# Patient Record
Sex: MALE | Race: WHITE | NOT HISPANIC OR LATINO | Employment: OTHER | URBAN - METROPOLITAN AREA
[De-identification: names, ages, dates, MRNs, and addresses within clinical notes are randomized per-mention and may not be internally consistent; named-entity substitution may affect disease eponyms.]

---

## 2021-04-14 DIAGNOSIS — Z23 ENCOUNTER FOR IMMUNIZATION: ICD-10-CM

## 2021-12-14 ENCOUNTER — OFFICE VISIT (OUTPATIENT)
Dept: ENDOCRINOLOGY | Facility: CLINIC | Age: 71
End: 2021-12-14
Payer: MEDICARE

## 2021-12-14 VITALS
WEIGHT: 220 LBS | SYSTOLIC BLOOD PRESSURE: 146 MMHG | HEIGHT: 74 IN | BODY MASS INDEX: 28.23 KG/M2 | DIASTOLIC BLOOD PRESSURE: 86 MMHG | OXYGEN SATURATION: 97 % | TEMPERATURE: 97.9 F | HEART RATE: 84 BPM | RESPIRATION RATE: 16 BRPM

## 2021-12-14 DIAGNOSIS — E78.5 HYPERLIPIDEMIA, UNSPECIFIED HYPERLIPIDEMIA TYPE: ICD-10-CM

## 2021-12-14 DIAGNOSIS — E11.65 TYPE 2 DIABETES MELLITUS WITH HYPERGLYCEMIA, WITH LONG-TERM CURRENT USE OF INSULIN (CMS/HCC): Primary | ICD-10-CM

## 2021-12-14 DIAGNOSIS — E65 LIPOHYPERTROPHY: ICD-10-CM

## 2021-12-14 DIAGNOSIS — Z79.4 LONG-TERM INSULIN USE (CMS/HCC): ICD-10-CM

## 2021-12-14 DIAGNOSIS — I10 HYPERTENSION, UNSPECIFIED TYPE: ICD-10-CM

## 2021-12-14 DIAGNOSIS — Z79.4 TYPE 2 DIABETES MELLITUS WITH HYPERGLYCEMIA, WITH LONG-TERM CURRENT USE OF INSULIN (CMS/HCC): Primary | ICD-10-CM

## 2021-12-14 PROBLEM — A69.20 LYME DISEASE: Status: ACTIVE | Noted: 2021-11-08

## 2021-12-14 PROBLEM — E78.49 OTHER HYPERLIPIDEMIA: Status: ACTIVE | Noted: 2019-02-11

## 2021-12-14 PROBLEM — F41.8 DEPRESSION WITH ANXIETY: Status: ACTIVE | Noted: 2021-09-10

## 2021-12-14 PROBLEM — G47.33 OBSTRUCTIVE SLEEP APNEA (ADULT) (PEDIATRIC): Status: ACTIVE | Noted: 2021-09-10

## 2021-12-14 PROBLEM — E04.1 THYROID NODULE: Status: ACTIVE | Noted: 2019-12-10

## 2021-12-14 PROBLEM — M10.9 GOUT, UNSPECIFIED: Status: ACTIVE | Noted: 2021-09-10

## 2021-12-14 PROBLEM — K63.5 COLON POLYP: Status: ACTIVE | Noted: 2021-09-10

## 2021-12-14 PROBLEM — L30.9 DERMATITIS: Status: ACTIVE | Noted: 2021-09-10

## 2021-12-14 PROBLEM — N40.0 BENIGN PROSTATIC HYPERPLASIA: Status: ACTIVE | Noted: 2021-09-10
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
## 2021-12-14 PROBLEM — E78.00 PURE HYPERCHOLESTEROLEMIA: Status: ACTIVE | Noted: 2021-09-10

## 2021-12-14 PROBLEM — E55.9 VITAMIN D DEFICIENCY: Status: ACTIVE | Noted: 2019-02-11

## 2021-12-14 PROBLEM — E10.9: Status: ACTIVE | Noted: 2018-10-29

## 2021-12-14 PROCEDURE — G8753 SYS BP > OR = 140: HCPCS | Performed by: INTERNAL MEDICINE

## 2021-12-14 PROCEDURE — G8754 DIAS BP LESS 90: HCPCS | Performed by: INTERNAL MEDICINE

## 2021-12-14 PROCEDURE — 95251 CONT GLUC MNTR ANALYSIS I&R: CPT | Performed by: INTERNAL MEDICINE

## 2021-12-14 PROCEDURE — 99214 OFFICE O/P EST MOD 30 MIN: CPT | Performed by: INTERNAL MEDICINE

## 2021-12-14 RX ORDER — METFORMIN HYDROCHLORIDE 500 MG/1
TABLET ORAL
COMMUNITY
Start: 2021-11-21 | End: 2021-12-14 | Stop reason: SDUPTHER

## 2021-12-14 RX ORDER — METFORMIN HYDROCHLORIDE 500 MG/1
500 TABLET ORAL 2 TIMES DAILY WITH MEALS
Qty: 180 TABLET | Refills: 1 | Status: SHIPPED | OUTPATIENT
Start: 2021-12-14 | End: 2022-03-16

## 2021-12-14 RX ORDER — BLOOD-GLUCOSE,RECEIVER,CONT
EACH MISCELLANEOUS
COMMUNITY
Start: 2021-01-22 | End: 2022-06-16

## 2021-12-14 RX ORDER — INSULIN ASPART 100 [IU]/ML
INJECTION, SOLUTION INTRAVENOUS; SUBCUTANEOUS
COMMUNITY
Start: 2021-11-29 | End: 2022-06-16 | Stop reason: SDUPTHER

## 2021-12-14 RX ORDER — PEN NEEDLE, DIABETIC 30 GX3/16"
NEEDLE, DISPOSABLE MISCELLANEOUS
COMMUNITY
Start: 2021-06-05 | End: 2022-03-01 | Stop reason: SDUPTHER

## 2021-12-14 RX ORDER — CITALOPRAM 20 MG/1
20 TABLET, FILM COATED ORAL DAILY
COMMUNITY
End: 2022-03-16

## 2021-12-14 RX ORDER — INSULIN ASPART 100 [IU]/ML
INJECTION, SOLUTION INTRAVENOUS; SUBCUTANEOUS
COMMUNITY
Start: 2021-05-13 | End: 2022-03-16

## 2021-12-14 RX ORDER — TRAZODONE HYDROCHLORIDE 50 MG/1
50 TABLET ORAL
COMMUNITY
Start: 2021-07-17

## 2021-12-14 RX ORDER — DOXYCYCLINE HYCLATE 100 MG
TABLET ORAL
COMMUNITY
Start: 2021-11-08 | End: 2022-03-16

## 2021-12-14 RX ORDER — BLOOD-GLUCOSE SENSOR
EACH MISCELLANEOUS
COMMUNITY
Start: 2021-11-16 | End: 2022-06-16 | Stop reason: SDUPTHER

## 2021-12-14 RX ORDER — INSULIN GLARGINE 100 [IU]/ML
INJECTION, SOLUTION SUBCUTANEOUS
COMMUNITY
Start: 2021-05-13 | End: 2021-12-14

## 2021-12-14 RX ORDER — INSULIN GLARGINE 100 [IU]/ML
18 INJECTION, SOLUTION SUBCUTANEOUS DAILY
COMMUNITY
Start: 2021-11-21 | End: 2022-06-16 | Stop reason: SDUPTHER

## 2021-12-14 RX ORDER — BLOOD-GLUCOSE TRANSMITTER
EACH MISCELLANEOUS
COMMUNITY
Start: 2021-11-22 | End: 2022-06-16 | Stop reason: SDUPTHER

## 2021-12-14 RX ORDER — LANCETS 33 GAUGE
EACH MISCELLANEOUS
COMMUNITY
Start: 2021-06-05 | End: 2022-06-16

## 2021-12-14 NOTE — PROGRESS NOTES
REASON FOR VISIT:   Akin Daily is a 71 y.o. male who presents today for evaluation and management of his diabetes.   A consultation was requested by Ana Delgado MD.     Previous patient at [facility].  Interim followed Dr. Gregorio Coburn NP     Patient was accompanied by his wife who provided additional history.    DIABETES HISTORY:  Type of Diabetes: Initially type 2 in 2005 and as YANCI by Dr. Esquivel in 2014 (by blood test per wife). Since then on insulin  Prior DKA: no    Current diabetes treatment:   Basaglar 20 units AM  Novolog - BF ~ 12 units, lunch 0-8 units (takes 3 days a week), dinner 11-13 units  Metformin 500 mg bid since a year, started by Dr. Perkins  Correction scale sugar >150 1 unit for every 50 mg -does not use often    Past regimen:  Metformin, Jardiance    Continuous glucose monitor: Dexcom G6  CGM downloaded and values reviewed : Yes  Current regime: above  No. of days of tracings available 14  Average blood glucose 168  Stand dev 45  % above target 34  % at target 65  % below target <1  Continuous Glucose Monitoring (CGM) Interpretation  I reviewed 72 hours + of CGM data.   There is a trend for blood sugar postprandial hyperglycemia, elevated bedtime sugars, lowest sugars post lunch/predinner  Recommendations: below      COMPLICATIONS OF DIABETES:  Eye disease in the past: no DR   Last eye exam: 4/20    Nephropathy:  no    Peripheral neuropathy: no   History of foot sores/infections/amputations: no   Podiatrist     Macrovascular disease: Hyperlipidemia, (stopped statin for muscle aches)      DIET/EXERCISE HISTORY:  Diet: 2 meals/day, lunch is very light,   Exercise: no      GLYCEMIC CONTROL:  Glucometer: One Touch  Frequency of glucose monitoring:   Review of home blood glucose readings:  Before breakfast:    After breakfast:   Before lunch:   After lunch:   Before dinner:   After dinner:   Bedtime:   2 am:   Hypoglycemic awareness: yes  Frequency of lows: 1-2/week.  He is targeting  "sugar over 150 to prevent hypoglycemia overnight         Past Medical History:   Diagnosis Date   • Diabetes mellitus type I (CMS/HCC)      History reviewed. No pertinent surgical history.  Family History   Problem Relation Age of Onset   • Diabetes Biological Mother    • Heart attack Biological Mother    • Lung cancer Biological Father    • Colon cancer Biological Brother    • Diabetes Biological Brother    • Diabetes Maternal Grandmother      Current Outpatient Medications   Medication Sig Dispense Refill   • BASAGLAR KWIKPEN U-100 INSULIN 100 unit/mL (3 mL) pen      • blood-glucose meter,continuous (DEXCOM G6 ) misc by Not Applicable route.     • DEXCOM G6 SENSOR device      • DEXCOM G6 TRANSMITTER device      • insulin aspart U-100 100 unit/mL (3 mL) pen To use with meals as per the scale, max daily 40 units     • lancets (ONETOUCH DELICA PLUS LANCET) 33 gauge misc      • metFORMIN 500 mg tablet Take 1 tablet (500 mg total) by mouth 2 (two) times a day with meals. 180 tablet 1   • NovoLOG Flexpen U-100 Insulin 100 unit/mL (3 mL) pen      • pen needle, diabetic (BD MILLIE 2ND GEN PEN NEEDLE) 32 gauge x 5/32\" needle      • traZODone 50 mg tablet Take 50 mg by mouth.     • citalopram 20 mg tablet Take 20 mg by mouth daily.     • doxycycline hyclate 100 mg tablet        No current facility-administered medications for this visit.     Allergies   Allergen Reactions   • Poison Ivy Extract Rash   • Hay Fever And Allergy Relief      Social History     Socioeconomic History   • Marital status:      Spouse name: Not on file   • Number of children: Not on file   • Years of education: Not on file   • Highest education level: Not on file   Occupational History   • Not on file   Tobacco Use   • Smoking status: Never Smoker   • Smokeless tobacco: Never Used   Substance and Sexual Activity   • Alcohol use: Never   • Drug use: Never   • Sexual activity: Not on file   Other Topics Concern   • Not on file   Social " "History Narrative   • Not on file     Social Determinants of Health     Financial Resource Strain: Not on file   Food Insecurity: Not on file   Transportation Needs: Not on file   Physical Activity: Not on file   Stress: Not on file   Social Connections: Not on file   Intimate Partner Violence: Not on file   Housing Stability: Not on file        ROS:  The complete review of systems is otherwise negative except as noted in HPI.    PHYSICAL EXAM:  Vitals:    12/14/21 1258   BP: (!) 146/86   BP Location: Left upper arm   Patient Position: Sitting   Pulse: 84   Resp: 16   Temp: 36.6 °C (97.9 °F)   TempSrc: Temporal   SpO2: 97%   Weight: 99.8 kg (220 lb)   Height: 1.88 m (6' 2\")       Body mass index is 28.25 kg/m².    Wt Readings from Last 3 Encounters:   12/14/21 99.8 kg (220 lb)        GENERAL: Well developed, well nourished, in no acute distress  EYES: conjunctiva pink and moist, no proptosis  NECK/LYMPHATIC: Supple, nl cervical lymphadenopathy, acanthosis no   THYROID: thyroid palpable, no distinct nodules palpated, non tender on my exam  CARDIOVASCULAR: Regular rate and Regular rhythm  RESPIRATORY: Symmetrical chest expansion, normal respiratory effort, clear to auscultation bilaterally  GASTROINTESTINAL: Soft, non tender, inj site lipodystrophy  MUSCULOSKELETAL: no cyanosis, normal muscle mass, no edema in lower extremities  SKIN: Warm, dry, no lesions   NEUROLOGIC: Awake, alert, and communicating appropriately       OUTSIDE RECORDS: reviewed. Pertinent positives summarized in HPI    LABS:   12/21 -calcium 9.2, creatinine 1.16, LFTs normal, GFR over 60, urine microalbumin 10, , HDL 51, triglycerides 124, A1c 8.2    No results found for: HGBA1C, GLUCOSE, NA, K, CO2, CL, BUN, EGFR, CREATININE, CA, ALT, CHOL, HDL, LDL, TRIG, TSH    Imaging:     ASSESSMENT and PLAN    Akin Daily is a 71 y.o. male with YANCI complicated by hyperlipidemia    1. Diabetes Mellitus: YANCI with hyperglycemia/long-term insulin use  A1c: " 8.2 %    Recommendations based on review of CGM    We discussed in detail that he would benefit from carb counting since his meals are not carb consistent.  He does not feel comfortable  Lower Basaglar to 18 units daily  Reviewed bedtime blood sugar goal >100, advised not to eat at bedtime to achieve >150  Continue NovoLog 12 units with breakfast and dinner  Continue Metformin 500 mg twice daily  To use correction scale for lunch   Check antibodies for type I    Advised pt to contact the office for further medication adjustment if blood glucose out of target range for 3-5 days below 70 or above 250    2.  Hypertension: Elevated  He attributes to drinking coffee  Continue monitoring at home  Urine micro albumin normal    3.  Hyperlipidemia: LDL not at goal  Statin, Zetia could not tolerate muscle leg cramps  Follows PCP    4. Thyroid nodules  Stable left thyroid gland nodules and colloid cyst - Last ultrasound 2019  We will discuss at next visit     5.  Lipohypertrophy  Advised to avoid belly for insulin administration. Reviewed importance of site rotation and discussed alternate sites for insulin administration and Dexcom CGM    Several elements of diabetes self management were reviewed including, but not limited to the importance of blood sugar monitoring, symptoms and treatment of hypoglycemia and hyperglycemia/A1c goals, compliance with medications, diet, exercise, lifestyle issues, surveillance of eyes and feet and need for routine follow-up with ophthalmology and podiatry.    Pt is aware of alternatives of therapy, risks and benefits and accepts risk of default.      I have answered all of my patient's questions to the best of my ability. To call us with any changes      Orders Placed This Encounter   Procedures   • Hemoglobin A1c   • Zinc Transporter 8 (ZnT8) Antibody, test code-02878, CPT code - 97429 -[facility] - Miscellaneous Test   • Glutamic Acid Decarboxylase-65 Antibody   • IA2 Autoantibodies, TEST number:  [id number], CPT: 65992 - [facility] - Miscellaneous Test       This note was sent to PCP    Return to office in 3 months or earlier if issues arise     This patient has been dictated using speech recognition software. Inadvertent speech recognition errors should be disregarded. Please do not hesitate to call the office for clarification.

## 2022-03-01 DIAGNOSIS — E11.65 UNCONTROLLED TYPE 2 DIABETES MELLITUS WITH HYPERGLYCEMIA (CMS/HCC): Primary | ICD-10-CM

## 2022-03-01 RX ORDER — PEN NEEDLE, DIABETIC 30 GX3/16"
200 NEEDLE, DISPOSABLE MISCELLANEOUS 4 TIMES DAILY
Qty: 200 EACH | Refills: 1 | Status: SHIPPED | OUTPATIENT
Start: 2022-03-01 | End: 2022-06-07

## 2022-03-01 NOTE — TELEPHONE ENCOUNTER
"    Do you have enough medication for the next 5 days?: yes    Did you request this medication through your pharmacy or our patient portal in the last day or two? no    Name of medication requested:   pen needle, diabetic (BD MILLIE 2ND GEN PEN NEEDLE) 32 gauge x 5/32\" needle       Medication Strength:   Mediation Directions:   Quantity Requested (example 30/90):   Number of refills requested:       System Generated Preferred Pharmacy(s)  are as follows.  If more than one pharmacy displays please identify which one should be used for this call    Has the pharmacy information below been confirmed with the patient?    yes     [id number] - [place] - [address] [place] [address]  [place] [address]  Phone: [phone] Fax: [phone]          If necessary, provide pharmacy details below.     Is this pharmacy a mail order pharmacy?:   Pharmacy Name:   Pharmacy City:   Pharmacy Telephone:     Additional Comments:    Next Encounter with this provider: 3/16/2022  "

## 2022-03-16 ENCOUNTER — OFFICE VISIT (OUTPATIENT)
Dept: ENDOCRINOLOGY | Facility: CLINIC | Age: 72
End: 2022-03-16
Payer: MEDICARE

## 2022-03-16 VITALS
TEMPERATURE: 97.9 F | DIASTOLIC BLOOD PRESSURE: 80 MMHG | HEIGHT: 74 IN | SYSTOLIC BLOOD PRESSURE: 140 MMHG | RESPIRATION RATE: 16 BRPM | HEART RATE: 80 BPM | WEIGHT: 218 LBS | BODY MASS INDEX: 27.98 KG/M2 | OXYGEN SATURATION: 95 %

## 2022-03-16 DIAGNOSIS — E11.65 TYPE 2 DIABETES MELLITUS WITH HYPERGLYCEMIA, WITH LONG-TERM CURRENT USE OF INSULIN (CMS/HCC): Primary | ICD-10-CM

## 2022-03-16 DIAGNOSIS — I10 HYPERTENSION, UNSPECIFIED TYPE: ICD-10-CM

## 2022-03-16 DIAGNOSIS — E65 LIPOHYPERTROPHY: ICD-10-CM

## 2022-03-16 DIAGNOSIS — Z79.4 TYPE 2 DIABETES MELLITUS WITH HYPERGLYCEMIA, WITH LONG-TERM CURRENT USE OF INSULIN (CMS/HCC): Primary | ICD-10-CM

## 2022-03-16 DIAGNOSIS — Z79.4 LONG-TERM INSULIN USE (CMS/HCC): ICD-10-CM

## 2022-03-16 DIAGNOSIS — E78.5 HYPERLIPIDEMIA, UNSPECIFIED HYPERLIPIDEMIA TYPE: ICD-10-CM

## 2022-03-16 PROCEDURE — G8754 DIAS BP LESS 90: HCPCS | Performed by: INTERNAL MEDICINE

## 2022-03-16 PROCEDURE — 99214 OFFICE O/P EST MOD 30 MIN: CPT | Performed by: INTERNAL MEDICINE

## 2022-03-16 PROCEDURE — 95251 CONT GLUC MNTR ANALYSIS I&R: CPT | Performed by: INTERNAL MEDICINE

## 2022-03-16 PROCEDURE — G8753 SYS BP > OR = 140: HCPCS | Performed by: INTERNAL MEDICINE

## 2022-03-16 NOTE — PROGRESS NOTES
REASON FOR VISIT:   Akin Daily is a 71 y.o. male who presents today for evaluation and management of his diabetes.   A consultation was requested by Ana Delgado MD.     Previous patient at [facility]. Interim followed [facility], Dr. Perkins     Patient was accompanied by his wife who provided additional history.    03/16/22   No issues interim        DIABETES HISTORY:  Type of Diabetes: Initially type 2 in 2005 and as YANCI by Dr. Esquivel in 2014 (by blood test per wife). Since then on insulin  Prior DKA: no    Current diabetes treatment:   Basaglar 18 units AM  Novolog - BF ~ 12 units, lunch 9-10 units (if eats full lunch), dinner 12-13 units  Correction scale sugar >150 1 unit for every 50 mg - does not use often    Past regimen:  Metformin, Jardiance  Stopped Metformin with belching    Continuous glucose monitor: Dexcom G6  CGM downloaded and values reviewed : Yes  Current regime: above  No. of days of tracings available 14  Average blood glucose 186  Stand dev 52   % above target 48   % at target 51.5   % below target 0.1  Continuous Glucose Monitoring (CGM) Interpretation  I reviewed 72 hours + of CGM data.   There is a trend for blood sugar elevated bedtime sugars, tries to keep over 180 at bedtime, Post lunch hyperglycemia at times, variable post breakfast or post lunch hypoglycemia  Recommendations: below      COMPLICATIONS OF DIABETES:  Eye disease in the past: no DR   Last eye exam: 4/20    Nephropathy:  no    Peripheral neuropathy: no   History of foot sores/infections/amputations: no   Podiatrist     Macrovascular disease: Hyperlipidemia, (stopped statin for muscle aches)      DIET/EXERCISE HISTORY:  Diet: 2 meals/day, lunch is very light,   Snack: 2 small Ice cream bars daily, Peanut at night, or cracker if BS <180  Exercise: no      GLYCEMIC CONTROL:  Glucometer: One Touch  Frequency of glucose monitoring: Follows Dexcom G6  Review of home blood glucose readings:  Before breakfast:    After  "breakfast:   Before lunch:   After lunch:   Before dinner:   After dinner:   Bedtime:   2 am:   Hypoglycemic awareness: yes  Frequency of lows: 1-2/week.       Past Medical History:   Diagnosis Date   • Benign prostatic hyperplasia 9/10/2021   • Diabetes mellitus type I (CMS/HCC)    • Gout, unspecified 9/10/2021   • Obstructive sleep apnea (adult) (pediatric) 9/10/2021   • Other hyperlipidemia 2/11/2019   • Thyroid nodule 12/10/2019     History reviewed. No pertinent surgical history.  Family History   Problem Relation Age of Onset   • Diabetes Biological Mother    • Heart attack Biological Mother    • Lung cancer Biological Father    • Colon cancer Biological Brother    • Diabetes Biological Brother    • Diabetes Maternal Grandmother      Current Outpatient Medications   Medication Sig Dispense Refill   • BASAGLAR KWIKPEN U-100 INSULIN 100 unit/mL (3 mL) pen Inject 18 Units under the skin daily.       • blood-glucose meter,continuous (DEXCOM G6 ) misc by Not Applicable route.     • DEXCOM G6 SENSOR device      • DEXCOM G6 TRANSMITTER device      • lancets (ONETOUCH DELICA PLUS LANCET) 33 gauge misc      • NovoLOG Flexpen U-100 Insulin 100 unit/mL (3 mL) pen      • pen needle, diabetic (BD MILLIE 2ND GEN PEN NEEDLE) 32 gauge x 5/32\" needle Inject 200 each under the skin 4 (four) times a day. 200 each 1   • traZODone 50 mg tablet Take 50 mg by mouth.     • citalopram 20 mg tablet Take 20 mg by mouth daily.     • doxycycline hyclate 100 mg tablet      • insulin aspart U-100 100 unit/mL (3 mL) pen To use with meals as per the scale, max daily 40 units       No current facility-administered medications for this visit.     Allergies   Allergen Reactions   • Poison Ivy Extract Rash   • Hay Fever And Allergy Relief      Social History     Socioeconomic History   • Marital status:      Spouse name: Not on file   • Number of children: Not on file   • Years of education: Not on file   • Highest education level: " "Not on file   Occupational History   • Not on file   Tobacco Use   • Smoking status: Never Smoker   • Smokeless tobacco: Never Used   Substance and Sexual Activity   • Alcohol use: Never   • Drug use: Never   • Sexual activity: Not on file   Other Topics Concern   • Not on file   Social History Narrative   • Not on file     Social Determinants of Health     Financial Resource Strain: Not on file   Food Insecurity: Not on file   Transportation Needs: Not on file   Physical Activity: Not on file   Stress: Not on file   Social Connections: Not on file   Intimate Partner Violence: Not on file   Housing Stability: Not on file        ROS:  The complete review of systems is otherwise negative except as noted in HPI.    PHYSICAL EXAM:  Vitals:    03/16/22 1338   BP: 140/80   BP Location: Right upper arm   Patient Position: Sitting   Pulse: 80   Resp: 16   Temp: 36.6 °C (97.9 °F)   TempSrc: Temporal   SpO2: 95%   Weight: 98.9 kg (218 lb)   Height: 1.88 m (6' 2\")       Body mass index is 27.99 kg/m².    Wt Readings from Last 3 Encounters:   03/16/22 98.9 kg (218 lb)   12/14/21 99.8 kg (220 lb)        GENERAL: Well developed, well nourished, in no acute distress  EYES: conjunctiva pink and moist, no proptosis  NECK/LYMPHATIC: Supple, nl cervical lymphadenopathy, acanthosis no   THYROID: thyroid palpable, no distinct nodules palpated, non tender on my exam  CARDIOVASCULAR: Regular rate and Regular rhythm  RESPIRATORY: Symmetrical chest expansion, normal respiratory effort, clear to auscultation bilaterally  GASTROINTESTINAL: Soft, non tender, inj site lipodystrophy  MUSCULOSKELETAL: no cyanosis, normal muscle mass, no edema in lower extremities  SKIN: Warm, dry, no lesions   NEUROLOGIC: Awake, alert, and communicating appropriately       OUTSIDE RECORDS: reviewed. Pertinent positives summarized in HPI    LABS:   3/22 -A1c 8.5, MANSOOR <5    12/21 -calcium 9.2, creatinine 1.16, LFTs normal, GFR over 60, urine microalbumin 10, LDL " 129, HDL 51, triglycerides 124, A1c 8.2    No results found for: HGBA1C, GLUCOSE, NA, K, CO2, CL, BUN, EGFR, CREATININE, CA, ALT, CHOL, HDL, LDL, TRIG, TSH    Imaging:     ASSESSMENT and PLAN    Akin Daily is a 71 y.o. male with YANCI complicated by hyperlipidemia    1. Diabetes Mellitus:   YANCI with hyperglycemia/long-term insulin use  A1c worsen: 8.5 %.  Reviewed goal A1c under 7 and target >70%    Recommendations based on review of CGM  Reviewed bedtime blood sugar goal >100, advised not to eat at bedtime to achieve >180.  To limit ice creams after dinner  We discussed in detail that he would benefit from carb counting since his meals are not carb consistent.    Continue Basaglar 18 units daily  Continue NovoLog 12 units with breakfast and dinner  Stop Metformin experiencing side effects  To take half the Novolog insulin if eating <50 %  Correction scale sugar >150 1 unit for every 50 mg rise in sugar    Advised pt to contact the office for further medication adjustment if blood glucose out of target range for 3-5 days below 70 or above 250    2.  Hypertension: At goal  Urine micro albumin normal  Check in a year 12/22    3.  Hyperlipidemia: LDL not at goal  Statin, Zetia could not tolerate muscle leg cramps  Follows PCP    4. Thyroid nodules  Stable left thyroid gland nodules and colloid cyst - Last ultrasound 2019  Check TFT, thyroid ultrasound at next visit    5.  Lipohypertrophy  Advised to avoid belly for insulin administration. Reviewed importance of site rotation and discussed alternate sites for insulin administration       Several elements of diabetes self management were reviewed including, but not limited to the importance of blood sugar monitoring, symptoms and treatment of hypoglycemia and hyperglycemia/A1c goals, compliance with medications, diet, exercise, lifestyle issues, surveillance of eyes and feet and need for routine follow-up with ophthalmology and podiatry.    Pt is aware of alternatives of  therapy, risks and benefits and accepts risk of default.      I have answered all of my patient's questions to the best of my ability. To call us with any changes      Orders Placed This Encounter   Procedures   • Hemoglobin A1c       Return to office in 3 months or earlier if issues arise     This patient has been dictated using speech recognition software. Inadvertent speech recognition errors should be disregarded. Please do not hesitate to call the office for clarification.

## 2022-06-07 RX ORDER — PEN NEEDLE, DIABETIC 32GX 5/32"
NEEDLE, DISPOSABLE MISCELLANEOUS
COMMUNITY
Start: 2022-04-16 | End: 2022-06-16

## 2022-06-16 ENCOUNTER — OFFICE VISIT (OUTPATIENT)
Dept: ENDOCRINOLOGY | Facility: CLINIC | Age: 72
End: 2022-06-16
Payer: MEDICARE

## 2022-06-16 VITALS
BODY MASS INDEX: 26.95 KG/M2 | RESPIRATION RATE: 16 BRPM | HEART RATE: 64 BPM | DIASTOLIC BLOOD PRESSURE: 80 MMHG | SYSTOLIC BLOOD PRESSURE: 128 MMHG | WEIGHT: 210 LBS | HEIGHT: 74 IN | OXYGEN SATURATION: 97 %

## 2022-06-16 DIAGNOSIS — E65 LIPOHYPERTROPHY: ICD-10-CM

## 2022-06-16 DIAGNOSIS — E78.5 HYPERLIPIDEMIA, UNSPECIFIED HYPERLIPIDEMIA TYPE: ICD-10-CM

## 2022-06-16 DIAGNOSIS — E16.2 HYPOGLYCEMIA: ICD-10-CM

## 2022-06-16 DIAGNOSIS — Z79.4 LONG-TERM INSULIN USE (CMS/HCC): Primary | ICD-10-CM

## 2022-06-16 DIAGNOSIS — E04.2 MULTIPLE THYROID NODULES: ICD-10-CM

## 2022-06-16 DIAGNOSIS — I10 HYPERTENSION, UNSPECIFIED TYPE: ICD-10-CM

## 2022-06-16 DIAGNOSIS — E13.9 LADA (LATENT AUTOIMMUNE DIABETES IN ADULTS), MANAGED AS TYPE 1 (CMS/HCC): ICD-10-CM

## 2022-06-16 PROBLEM — E10.9: Status: RESOLVED | Noted: 2018-10-29 | Resolved: 2022-06-16

## 2022-06-16 PROBLEM — E11.65 TYPE 2 DIABETES MELLITUS WITH HYPERGLYCEMIA, WITH LONG-TERM CURRENT USE OF INSULIN (CMS/HCC): Status: RESOLVED | Noted: 2021-12-14 | Resolved: 2022-06-16

## 2022-06-16 PROBLEM — S86.001A INJURY OF RIGHT ACHILLES TENDON: Status: ACTIVE | Noted: 2022-04-21

## 2022-06-16 PROCEDURE — G8752 SYS BP LESS 140: HCPCS | Performed by: INTERNAL MEDICINE

## 2022-06-16 PROCEDURE — G8754 DIAS BP LESS 90: HCPCS | Performed by: INTERNAL MEDICINE

## 2022-06-16 PROCEDURE — 99214 OFFICE O/P EST MOD 30 MIN: CPT | Performed by: INTERNAL MEDICINE

## 2022-06-16 RX ORDER — BLOOD-GLUCOSE SENSOR
EACH MISCELLANEOUS
Qty: 9 EACH | Refills: 3 | Status: SHIPPED | OUTPATIENT
Start: 2022-06-16 | End: 2023-06-22 | Stop reason: SDUPTHER

## 2022-06-16 RX ORDER — INSULIN GLARGINE 100 [IU]/ML
18 INJECTION, SOLUTION SUBCUTANEOUS DAILY
Qty: 10 PEN | Refills: 3 | Status: SHIPPED | OUTPATIENT
Start: 2022-06-16 | End: 2023-06-22 | Stop reason: SDUPTHER

## 2022-06-16 RX ORDER — INSULIN ASPART 100 [IU]/ML
INJECTION, SOLUTION INTRAVENOUS; SUBCUTANEOUS
Qty: 20 PEN | Refills: 3 | Status: SHIPPED | OUTPATIENT
Start: 2022-06-16 | End: 2023-06-22

## 2022-06-16 RX ORDER — BLOOD-GLUCOSE TRANSMITTER
EACH MISCELLANEOUS
Qty: 1 EACH | Refills: 3 | Status: SHIPPED | OUTPATIENT
Start: 2022-06-16 | End: 2023-06-22 | Stop reason: SDUPTHER

## 2022-06-16 NOTE — PROGRESS NOTES
REASON FOR VISIT:   Akin Daily is a 71 y.o. male who presents today for evaluation and management of his diabetes.   A consultation was requested by Ana Delgado MD.     Previous patient at [facility]. Interim followed Dr. Gregorio Coburn NP        06/16/22   No issues interim   Patient was accompanied by his wife who provided additional history.  Has ice cream daily right after dinner  Has been physically active in summer with gardening    DIABETES HISTORY:  Type of Diabetes: Initially type 2 in 2005 and as YANCI by Dr. Esquivel in 2014 (by blood test per wife). Since then on insulin  Prior DKA: no    Current diabetes treatment:   Basaglar 18 units AM  Novolog - BF ~ 10-11 units, lunch 9-10 units (if eats full lunch), dinner 12-13 units  Correction scale sugar >150 1 unit for every 50 mg - does not use often    Past regimen:  Metformin, Jardiance  Stopped Metformin with belching    Continuous glucose monitor: Dexcom G6  CGM downloaded and values reviewed : Yes  Current regime: above  No. of days of tracings available 14  Average blood glucose 179  Stand dev 56  % above target 43  % at target 56  % below target 1  Continuous Glucose Monitoring (CGM) Interpretation  I reviewed 72 hours + of CGM data.   There is a trend for blood sugar -post correction hypoglycemia following breakfast and lunch.  Post dinner hyperglycemia.  Recommendations: below      COMPLICATIONS OF DIABETES:  Eye disease in the past: no DR   Last eye exam: 4/20    Nephropathy:  no    Peripheral neuropathy: no   History of foot sores/infections/amputations: no   Podiatrist     Macrovascular disease: Hyperlipidemia, (stopped statin for muscle aches)      DIET/EXERCISE HISTORY:  Diet: 2-3 meals/day, lunch is very light,   Snack: 2 small Ice cream bars daily, Peanut at night, or cracker if BS <180  Exercise: no      GLYCEMIC CONTROL:  Glucometer: One Touch  Frequency of glucose monitoring: Follows Dexcom G6  Review of home blood glucose  "readings:  Before breakfast:    After breakfast:   Before lunch:   After lunch:   Before dinner:   After dinner:   Bedtime:   2 am:   Hypoglycemic awareness: yes  Frequency of lows: 1-2/week.  Find sensitive after excessive activity    To office children or physicians, foot and jamie specialist and dentist      Past Medical History:   Diagnosis Date   • Benign prostatic hyperplasia 9/10/2021   • Diabetes mellitus type I (CMS/HCC)    • Gout, unspecified 9/10/2021   • Obstructive sleep apnea (adult) (pediatric) 9/10/2021   • Other hyperlipidemia 2/11/2019   • Thyroid nodule 12/10/2019     History reviewed. No pertinent surgical history.  Family History   Problem Relation Age of Onset   • Diabetes Biological Mother    • Heart attack Biological Mother    • Lung cancer Biological Father    • Colon cancer Biological Brother    • Diabetes Biological Brother    • Diabetes Maternal Grandmother      Current Outpatient Medications   Medication Sig Dispense Refill   • BASAGLAR KWIKPEN U-100 INSULIN 100 unit/mL (3 mL) pen Inject 18 Units under the skin daily.       • BD MILLIE 2ND GEN PEN NEEDLE 32 gauge x 5/32\" needle USE TO INJECT UNDER THE SKIN 4 (FOUR) TIMES A DAY.     • blood-glucose meter,continuous (DEXCOM G6 ) misc by Not Applicable route.     • DEXCOM G6 SENSOR device      • DEXCOM G6 TRANSMITTER device      • lancets 33 gauge misc      • NovoLOG Flexpen U-100 Insulin 100 unit/mL (3 mL) pen      • pen needle, diabetic (BD MILLIE 2ND GEN PEN NEEDLE) 32 gauge x 5/32\" needle To inject upto 4 times a day 400 each 3   • traZODone 50 mg tablet Take 50 mg by mouth.       No current facility-administered medications for this visit.     Allergies   Allergen Reactions   • Poison Ivy Extract Rash   • Hay Fever And Allergy Relief Other (see comments)     Social History     Socioeconomic History   • Marital status:      Spouse name: Not on file   • Number of children: Not on file   • Years of education: Not on file   • " "Highest education level: Not on file   Occupational History   • Not on file   Tobacco Use   • Smoking status: Never Smoker   • Smokeless tobacco: Never Used   Substance and Sexual Activity   • Alcohol use: Never   • Drug use: Never   • Sexual activity: Not on file   Other Topics Concern   • Not on file   Social History Narrative   • Not on file     Social Determinants of Health     Financial Resource Strain: Not on file   Food Insecurity: Not on file   Transportation Needs: Not on file   Physical Activity: Not on file   Stress: Not on file   Social Connections: Not on file   Intimate Partner Violence: Not on file   Housing Stability: Not on file        ROS:  The complete review of systems is otherwise negative except as noted in HPI.    PHYSICAL EXAM:  Vitals:    06/16/22 1253   BP: 128/80   BP Location: Right upper arm   Patient Position: Sitting   Pulse: 64   Resp: 16   SpO2: 97%   Weight: 95.3 kg (210 lb)   Height: 1.88 m (6' 2\")       Body mass index is 26.96 kg/m².    Wt Readings from Last 3 Encounters:   06/16/22 95.3 kg (210 lb)   03/16/22 98.9 kg (218 lb)   12/14/21 99.8 kg (220 lb)        GENERAL: Well developed, well nourished, in no acute distress  EYES: conjunctiva pink and moist, no proptosis  NECK/LYMPHATIC: Supple, nl cervical lymphadenopathy, acanthosis no   THYROID: thyroid palpable, no distinct nodules palpated, non tender on my exam  CARDIOVASCULAR: Regular rate and Regular rhythm  RESPIRATORY: Symmetrical chest expansion, normal respiratory effort, clear to auscultation bilaterally  GASTROINTESTINAL: Soft, non tender, inj site lipodystrophy  MUSCULOSKELETAL: no cyanosis, normal muscle mass, no edema in lower extremities  SKIN: Warm, dry, no lesions   NEUROLOGIC: Awake, alert, and communicating appropriately       OUTSIDE RECORDS: reviewed. Pertinent positives summarized in HPI    LABS:   6/22 -A1c 8.4,   3/22 -A1c 8.5, MANSOOR <5, IA-2 and Zn8 neg    12/21 -calcium 9.2, creatinine 1.16, LFTs normal, " GFR over 60, urine microalbumin 10, , HDL 51, triglycerides 124, A1c 8.2    No results found for: HGBA1C, GLUCOSE, NA, K, CO2, CL, BUN, EGFR, CREATININE, CA, ALT, CHOL, HDL, LDL, TRIG, TSH    Imaging:     ASSESSMENT and PLAN    Akin Daily is a 71 y.o. male with YANCI complicated by hyperlipidemia    1. Diabetes Mellitus: YANCI with hyperglycemia and hypoglycemia/long-term insulin use  A1c 8.4 %.     Recalls diagnosed with Yanci by Dr. Camp based on his body habitus and unsure of labs.  MANSOOR, IA2, ZnT8 antibodies were negative.   Check fasting glucose and C-peptide    Recommendations based on review of CGM  To lower NovoLog to 8 units for breakfast and dinner.  To reduce Basaglar to 15 units and NovoLog by 10% prior to that meal on the day of planned exercise activity.    Reinforced to add protein with each meal   To take half the Novolog insulin if eating <50 %  Correction scale sugar >150 1 unit for every 50 mg rise in sugar    Advised pt to contact the office for further medication adjustment if blood glucose out of target range for 3-5 days below 70 or above 250    2.  Hypertension: At goal  Urine micro albumin normal  Check in a year 12/22    3.  Hyperlipidemia: LDL not at goal  Statin, Zetia could not tolerate muscle leg cramps  Follows PCP    4. Thyroid nodules  Stable left thyroid gland nodules and colloid cyst - Last ultrasound 2019  Check TFT, thyroid ultrasound at next visit    5.  Lipohypertrophy  Reviewed importance of site rotation and discussed alternate sites for insulin administration       Several elements of diabetes self management were reviewed including, but not limited to the importance of blood sugar monitoring, symptoms and treatment of hypoglycemia and hyperglycemia/A1c goals, compliance with medications, diet, exercise, lifestyle issues, surveillance of eyes and feet and need for routine follow-up with ophthalmology and podiatry.    Pt is aware of alternatives of therapy, risks and  benefits and accepts risk of default.      I have answered all of my patient's questions to the best of my ability. To call us with any changes      Orders Placed This Encounter   Procedures   • ULTRASOUND THYROID   • Glucose   • C-peptide   • Hemoglobin A1c   • TSH 3rd Generation   • T4, free       Return to office in 3 months or earlier if issues arise     This patient has been dictated using speech recognition software. Inadvertent speech recognition errors should be disregarded. Please do not hesitate to call the office for clarification.

## 2022-08-22 PROBLEM — E78.00 PURE HYPERCHOLESTEROLEMIA: Status: ACTIVE | Noted: 2021-09-10

## 2022-08-22 PROBLEM — G47.00 INSOMNIA, UNSPECIFIED: Status: ACTIVE | Noted: 2022-08-18

## 2022-08-22 PROBLEM — F41.8 DEPRESSION WITH ANXIETY: Status: ACTIVE | Noted: 2021-09-10

## 2022-08-22 RX ORDER — FLUOXETINE HYDROCHLORIDE 20 MG/1
40 CAPSULE ORAL DAILY
COMMUNITY
Start: 2022-08-18 | End: 2025-01-07 | Stop reason: DRUGHIGH

## 2022-09-21 ENCOUNTER — OFFICE VISIT (OUTPATIENT)
Dept: ENDOCRINOLOGY | Facility: CLINIC | Age: 72
End: 2022-09-21
Payer: MEDICARE

## 2022-09-21 VITALS
WEIGHT: 215 LBS | TEMPERATURE: 98.4 F | RESPIRATION RATE: 18 BRPM | OXYGEN SATURATION: 96 % | HEART RATE: 76 BPM | BODY MASS INDEX: 28.49 KG/M2 | HEIGHT: 73 IN | DIASTOLIC BLOOD PRESSURE: 74 MMHG | SYSTOLIC BLOOD PRESSURE: 122 MMHG

## 2022-09-21 DIAGNOSIS — E13.9 LADA (LATENT AUTOIMMUNE DIABETES IN ADULTS), MANAGED AS TYPE 1 (CMS/HCC): ICD-10-CM

## 2022-09-21 DIAGNOSIS — I10 HYPERTENSION, UNSPECIFIED TYPE: ICD-10-CM

## 2022-09-21 DIAGNOSIS — Z79.4 LONG-TERM INSULIN USE (CMS/HCC): Primary | ICD-10-CM

## 2022-09-21 DIAGNOSIS — E04.2 MULTIPLE THYROID NODULES: ICD-10-CM

## 2022-09-21 DIAGNOSIS — E65 LIPOHYPERTROPHY: ICD-10-CM

## 2022-09-21 DIAGNOSIS — E78.5 HYPERLIPIDEMIA, UNSPECIFIED HYPERLIPIDEMIA TYPE: ICD-10-CM

## 2022-09-21 DIAGNOSIS — E16.2 HYPOGLYCEMIA: ICD-10-CM

## 2022-09-21 PROCEDURE — 99215 OFFICE O/P EST HI 40 MIN: CPT | Performed by: INTERNAL MEDICINE

## 2022-09-21 PROCEDURE — G8754 DIAS BP LESS 90: HCPCS | Performed by: INTERNAL MEDICINE

## 2022-09-21 PROCEDURE — G8752 SYS BP LESS 140: HCPCS | Performed by: INTERNAL MEDICINE

## 2022-09-21 NOTE — PROGRESS NOTES
REASON FOR VISIT:   Akin Daily is a 72 y.o. male who presents today for evaluation and management of his diabetes.   A consultation was requested by Ana Delgado MD.     Previous patient at [facility]. Interim followed Dr. Gregorio Coburn NP    09/21/22   No issues interim  Patient was accompanied by his wife who provided additional history.  Has been physically active in summer with gardening around lunch  Feels hungry and takes 2-3 tsp of peanut butter      DIABETES HISTORY:  Type of Diabetes: Initially type 2 in 2005 and as YANCI by Dr. Esquivel in 2014 (by blood test per wife). Since then on insulin  Prior DKA: no    Current diabetes treatment:   Basaglar 18 units AM  Novolog - BF ~ 7-11 units, lunch 0-8 units, dinner 5-8 units  Correction scale sugar >150 1 unit for every 50 mg - does not use often    Past regimen:  Metformin, Jardiance  Stopped Metformin with belching    Continuous glucose monitor: Dexcom G6  CGM downloaded and values reviewed : Yes  Current regime: above  No. of days of tracings available 14  Average blood glucose 183  Stand dev 51  % above target 48  % at target 51  % below target 1  Continuous Glucose Monitoring (CGM) Interpretation  I reviewed 72 hours + of CGM data.   There is a trend for blood sugar -post low correction hyperglycemia, hypoglycemia lunch time.  Post dinner hyperglycemia.  Recommendations: below      COMPLICATIONS OF DIABETES:  Eye disease in the past: no DR   Last eye exam: 4/20 - reinforced    Nephropathy:  no    Peripheral neuropathy: no   History of foot sores/infections/amputations: no   Podiatrist     Macrovascular disease: Hyperlipidemia, (stopped statin for muscle aches)      DIET/EXERCISE HISTORY:  Diet: 2-3 meals/day, lunch is very light peanut butter with cracker.  No protein for BF, BF is mainly carb cereal, rice inc BG to 300's  Snack: 2 small Ice cream bars daily, Peanut at night, or cracker if BS <180  Exercise: no      GLYCEMIC CONTROL:  Glucometer: One  "Touch  Frequency of glucose monitoring: Follows Dexcom G6  Review of home blood glucose readings:  Before breakfast:    After breakfast:   Before lunch:   After lunch:   Before dinner:   After dinner:   Bedtime:   2 am:   Hypoglycemic awareness: yes  Frequency of lows: 1-2/week.  Find sensitive after excessive activity    2 children physicians, foot and jamie specialist and dentist      Past Medical History:   Diagnosis Date    Benign prostatic hyperplasia 9/10/2021    Diabetes mellitus type I (CMS/HCC)     Gout, unspecified 9/10/2021    Obstructive sleep apnea (adult) (pediatric) 9/10/2021    Other hyperlipidemia 2/11/2019    Thyroid nodule 12/10/2019     History reviewed. No pertinent surgical history.  Family History   Problem Relation Age of Onset    Diabetes Biological Mother     Heart attack Biological Mother     Lung cancer Biological Father     Colon cancer Biological Brother     Diabetes Biological Brother     Diabetes Maternal Grandmother      Current Outpatient Medications   Medication Sig Dispense Refill    BASAGLAR KWIKPEN U-100 INSULIN 100 unit/mL (3 mL) pen Inject 18 Units under the skin daily. 10 pen 3    DEXCOM G6 SENSOR device To monitor sugars 9 each 3    DEXCOM G6 TRANSMITTER device Change every 90 days 1 each 3    FLUoxetine (PROzac) 20 mg capsule Take 20 mg by mouth daily.      NovoLOG Flexpen U-100 Insulin 100 unit/mL (3 mL) pen To take NovoLog with each meal as instructed including correction, maximum daily total 60 units 20 pen 3    pen needle, diabetic (BD MILLIE 2ND GEN PEN NEEDLE) 32 gauge x 5/32\" needle To inject upto 4 times a day 400 each 3    traZODone 50 mg tablet Take 50 mg by mouth.       No current facility-administered medications for this visit.     Allergies   Allergen Reactions    Poison Ivy Extract Rash    Hay Fever And Allergy Relief Other (see comments)     Social History     Socioeconomic History    Marital status:      Spouse name: Not on file " "   Number of children: Not on file    Years of education: Not on file    Highest education level: Not on file   Occupational History    Not on file   Tobacco Use    Smoking status: Never Smoker    Smokeless tobacco: Never Used   Substance and Sexual Activity    Alcohol use: Never    Drug use: Never    Sexual activity: Not on file   Other Topics Concern    Not on file   Social History Narrative    Not on file     Social Determinants of Health     Financial Resource Strain: Not on file   Food Insecurity: Not on file   Transportation Needs: Not on file   Physical Activity: Not on file   Stress: Not on file   Social Connections: Not on file   Intimate Partner Violence: Not on file   Housing Stability: Not on file        ROS:  The complete review of systems is otherwise negative except as noted in HPI.    PHYSICAL EXAM:  Vitals:    09/21/22 0929   BP: 122/74   BP Location: Right upper arm   Patient Position: Sitting   Pulse: 76   Resp: 18   Temp: 36.9 °C (98.4 °F)   TempSrc: Temporal   SpO2: 96%   Weight: 97.5 kg (215 lb)   Height: 1.854 m (6' 1\")       Body mass index is 28.37 kg/m².    Wt Readings from Last 3 Encounters:   09/21/22 97.5 kg (215 lb)   06/16/22 95.3 kg (210 lb)   03/16/22 98.9 kg (218 lb)        GENERAL: Well developed, well nourished, in no acute distress  EYES: conjunctiva pink and moist, no proptosis  NECK/LYMPHATIC: Supple, nl cervical lymphadenopathy, acanthosis no   THYROID: thyroid palpable, no distinct nodules palpated, non tender on my exam  CARDIOVASCULAR: Regular rate and Regular rhythm  RESPIRATORY: Symmetrical chest expansion, normal respiratory effort, clear to auscultation bilaterally  GASTROINTESTINAL: Soft, non tender, inj site lipodystrophy  MUSCULOSKELETAL: no cyanosis, normal muscle mass, no edema in lower extremities  SKIN: Warm, dry, no lesions   NEUROLOGIC: Awake, alert, and communicating appropriately       OUTSIDE RECORDS: reviewed. Pertinent positives summarized in " HPI    LABS:   9/22 -glucose 135, C-peptide 0.48, A1c 8.1    6/22 -A1c 8.4,   3/22 -A1c 8.5, MANSOOR <5, IA-2 and Zn8 neg    12/21 -calcium 9.2, creatinine 1.16, LFTs normal, GFR over 60, urine microalbumin 10, , HDL 51, triglycerides 124, A1c 8.2    No results found for: HGBA1C, GLUCOSE, NA, K, CO2, CL, BUN, EGFR, CREATININE, CA, ALT, CHOL, HDL, LDL, TRIG, TSH    Imaging:     ASSESSMENT and PLAN    Akin Daily is a 72 y.o. male with YANCI complicated by hyperlipidemia    1. Diabetes Mellitus: YANCI with hyperglycemia and hypoglycemia/long-term insulin use  A1c 8.1 %.  Improving.  A1c is likely elevated secondary to hypoglycemia and hyperglycemia post correction    Hypoglycemia is a serious risk of insulin and needs close monitoring    Low C-peptide. MANSOOR, IA2, ZnT8 antibodies were negative.   Recommend continuing insulin    Recommendations based on review of CGM  Continue Basaglar 18 units daily  Patient noted taking 6 to 8 units of insulin for peanut butter cracker for lunch and resulting hypoglycemia.  He also noted not taking insulin if blood sugars are in 80s and resulting hyperglycemia then he takes insulin.  Reviewed he needs higher dose of insulin to correct hyperglycemia than if he has taken insulin prior to the meal.  Reinforced to take insulin if he is not having the low blood sugar <70 and will be eating meal or carb.  To take only 2 units for lunch if he has peanut butter sandwich or crackers  Continue NovoLog with breakfast and dinner.  Reviewed example of Indonesian breakfast to have protein with each meal.  To have fiber and protein to reduce cravings and satiety  He deferred nutritionist  Increase NovoLog 10% if eating rice   Reinforced to use correction scale sugar >150 1 unit for every 50 mg rise in sugar  To maintain the log and bring to the visit to review    Advised pt to contact the office for further medication adjustment if blood glucose out of target range for 3-5 days below 70 or above  250    2.  Hypertension: At goal  Urine micro albumin normal  Check in a year 12/22    3.  Hyperlipidemia: LDL not at goal  Statin, Zetia could not tolerate muscle leg cramps  Follows PCP, advised to forward results    4. Thyroid nodules  Stable left thyroid gland nodules and colloid cyst - Last ultrasound 2019  Check TFT, thyroid ultrasound at next visit    5.  Lipohypertrophy  Reviewed importance of site rotation and discussed alternate sites for insulin administration       Several elements of diabetes self management were reviewed including, but not limited to the importance of blood sugar monitoring, symptoms and treatment of hypoglycemia and hyperglycemia/A1c goals, compliance with medications, diet, exercise, lifestyle issues, surveillance of eyes and feet and need for routine follow-up with ophthalmology and podiatry.    Pt is aware of alternatives of therapy, risks and benefits and accepts risk of default.      I have answered all of my patient's questions to the best of my ability. To call us with any changes      Orders Placed This Encounter   Procedures    ULTRASOUND THYROID    TSH 3rd Generation    T4, free    Hemoglobin A1c    Microalbumin/Creatinine Ur Random     I spent 42 minutes on this day of service performing the following activities; obtaining history, performing examination, entering orders, documenting, preparing for visit, obtaining/reviewing records, providing counseling and education and coordinating care.    Return to office in 3 months or earlier if issues arise     This patient has been dictated using speech recognition software. Inadvertent speech recognition errors should be disregarded. Please do not hesitate to call the office for clarification.

## 2022-12-29 ENCOUNTER — OFFICE VISIT (OUTPATIENT)
Dept: ENDOCRINOLOGY | Facility: CLINIC | Age: 72
End: 2022-12-29
Payer: MEDICARE

## 2022-12-29 VITALS
DIASTOLIC BLOOD PRESSURE: 72 MMHG | WEIGHT: 217 LBS | HEART RATE: 72 BPM | OXYGEN SATURATION: 97 % | RESPIRATION RATE: 18 BRPM | HEIGHT: 74 IN | TEMPERATURE: 97.8 F | SYSTOLIC BLOOD PRESSURE: 118 MMHG | BODY MASS INDEX: 27.85 KG/M2

## 2022-12-29 DIAGNOSIS — Z79.4 TYPE 2 DIABETES MELLITUS WITH HYPERGLYCEMIA, WITH LONG-TERM CURRENT USE OF INSULIN (CMS/HCC): Primary | ICD-10-CM

## 2022-12-29 DIAGNOSIS — E16.2 HYPOGLYCEMIA: ICD-10-CM

## 2022-12-29 DIAGNOSIS — E78.5 HYPERLIPIDEMIA, UNSPECIFIED HYPERLIPIDEMIA TYPE: ICD-10-CM

## 2022-12-29 DIAGNOSIS — E11.65 TYPE 2 DIABETES MELLITUS WITH HYPERGLYCEMIA, WITH LONG-TERM CURRENT USE OF INSULIN (CMS/HCC): Primary | ICD-10-CM

## 2022-12-29 DIAGNOSIS — E04.2 MULTIPLE THYROID NODULES: ICD-10-CM

## 2022-12-29 DIAGNOSIS — E13.9 LADA (LATENT AUTOIMMUNE DIABETES IN ADULTS), MANAGED AS TYPE 1 (CMS/HCC): ICD-10-CM

## 2022-12-29 DIAGNOSIS — I10 HYPERTENSION, UNSPECIFIED TYPE: ICD-10-CM

## 2022-12-29 DIAGNOSIS — E65 LIPOHYPERTROPHY: ICD-10-CM

## 2022-12-29 PROCEDURE — G8754 DIAS BP LESS 90: HCPCS | Performed by: INTERNAL MEDICINE

## 2022-12-29 PROCEDURE — 99214 OFFICE O/P EST MOD 30 MIN: CPT | Performed by: INTERNAL MEDICINE

## 2022-12-29 PROCEDURE — G8752 SYS BP LESS 140: HCPCS | Performed by: INTERNAL MEDICINE

## 2022-12-29 PROCEDURE — 95251 CONT GLUC MNTR ANALYSIS I&R: CPT | Performed by: INTERNAL MEDICINE

## 2022-12-29 NOTE — PROGRESS NOTES
REASON FOR VISIT:   Akin Daily is a 72 y.o. male who presents today for evaluation and management of his diabetes.   A consultation was requested by Ana Delgado MD.     Previous patient at [facility]. Interim followed Dr. Gregorio Coburn NP    12/29/22   No issues interim  Patient was accompanied by his wife who provided additional history.  He has large portion sizes, sweet tooth  He eats bedtime snack if sugars under 150, bedtime snacks  cereal    DIABETES HISTORY:  Type of Diabetes: Initially type 2 in 2005 and as YANCI by Dr. Esquivel in 2014 (by blood test per wife). Since then on insulin  Prior DKA: no    Current diabetes treatment:   Basaglar 18 units AM  Novolog - BF ~ 8-10 units, lunch 8-10 units, dinner 8-10, 15 units for rice  Correction scale sugar >150 1 unit for every 50 mg - does not use often    Past regimen:  Metformin, Jardiance  Stopped Metformin with belching    Continuous glucose monitor: Dexcom G6  CGM downloaded and values reviewed : Yes  Current regime: above  No. of days of tracings available 14  Average blood glucose 199  Stand dev 34  % above target 78  % at target 43  % below target 0.5  Continuous Glucose Monitoring (CGM) Interpretation  I reviewed 72 hours + of CGM data.   There is a trend for blood sugar - post lunch hypoglycemia, followed by hyperglycemia, bedtime hyperglycemia.  Recommendations: below      COMPLICATIONS OF DIABETES:  Eye disease in the past: no DR   Last eye exam: 4/20 - reinforced    Nephropathy:  no    Peripheral neuropathy: no   History of foot sores/infections/amputations: no   Podiatrist     Macrovascular disease: Hyperlipidemia, (stopped statin for muscle aches)      DIET/EXERCISE HISTORY:  Diet: 2-3 meals/day, lunch is very light peanut butter with cracker.  No protein for BF, BF is mainly carb cereal, rice inc BG to 300's  Snack: 2 small Ice cream bars daily, Peanut at night, or cracker if BS <180  Exercise: no      GLYCEMIC CONTROL:  Glucometer: One  "Touch  Frequency of glucose monitoring: Follows Dexcom G6  Review of home blood glucose readings:  Before breakfast:    After breakfast:   Before lunch:   After lunch:   Before dinner:   After dinner:   Bedtime:   2 am:   Hypoglycemic awareness: yes  Frequency of lows:     2 children physicians, foot and jamie specialist and dentist      Past Medical History:   Diagnosis Date   • Benign prostatic hyperplasia 9/10/2021   • Diabetes mellitus type I (CMS/HCC)    • Gout, unspecified 9/10/2021   • Obstructive sleep apnea (adult) (pediatric) 9/10/2021   • Other hyperlipidemia 2/11/2019   • Thyroid nodule 12/10/2019     History reviewed. No pertinent surgical history.  Family History   Problem Relation Age of Onset   • Diabetes Biological Mother    • Heart attack Biological Mother    • Lung cancer Biological Father    • Colon cancer Biological Brother    • Diabetes Biological Brother    • Diabetes Maternal Grandmother      Current Outpatient Medications   Medication Sig Dispense Refill   • BASAGLAR KWIKPEN U-100 INSULIN 100 unit/mL (3 mL) pen Inject 18 Units under the skin daily. 10 pen 3   • DEXCOM G6 SENSOR device To monitor sugars 9 each 3   • DEXCOM G6 TRANSMITTER device Change every 90 days 1 each 3   • FLUoxetine (PROzac) 20 mg capsule Take 20 mg by mouth daily.     • NovoLOG Flexpen U-100 Insulin 100 unit/mL (3 mL) pen To take NovoLog with each meal as instructed including correction, maximum daily total 60 units 20 pen 3   • pen needle, diabetic (BD MILLIE 2ND GEN PEN NEEDLE) 32 gauge x 5/32\" needle To inject upto 4 times a day 400 each 3   • traZODone 50 mg tablet Take 50 mg by mouth.       No current facility-administered medications for this visit.     Allergies   Allergen Reactions   • Poison Ivy Extract Rash   • Hay Fever And Allergy Relief Other (see comments)     Social History     Socioeconomic History   • Marital status:      Spouse name: Not on file   • Number of children: Not on file   • Years of " "education: Not on file   • Highest education level: Not on file   Occupational History   • Not on file   Tobacco Use   • Smoking status: Never   • Smokeless tobacco: Never   Substance and Sexual Activity   • Alcohol use: Never   • Drug use: Never   • Sexual activity: Not on file   Other Topics Concern   • Not on file   Social History Narrative   • Not on file     Social Determinants of Health     Financial Resource Strain: Not on file   Food Insecurity: Not on file   Transportation Needs: Not on file   Physical Activity: Not on file   Stress: Not on file   Social Connections: Not on file   Intimate Partner Violence: Not on file   Housing Stability: Not on file        ROS:  The complete review of systems is otherwise negative except as noted in HPI.    PHYSICAL EXAM:  Vitals:    12/29/22 1148   BP: 118/72   BP Location: Right upper arm   Patient Position: Sitting   Pulse: 72   Resp: 18   Temp: 36.6 °C (97.8 °F)   TempSrc: Temporal   SpO2: 97%   Weight: 98.4 kg (217 lb)   Height: 1.88 m (6' 2\")       Body mass index is 27.86 kg/m².    Wt Readings from Last 3 Encounters:   12/29/22 98.4 kg (217 lb)   09/21/22 97.5 kg (215 lb)   06/16/22 95.3 kg (210 lb)        GENERAL: Well developed, well nourished, in no acute distress  EYES: conjunctiva pink and moist, no proptosis  NECK/LYMPHATIC: Supple, nl cervical lymphadenopathy, acanthosis no   THYROID: thyroid palpable, no distinct nodules palpated, non tender on my exam  CARDIOVASCULAR: Regular rate and Regular rhythm  RESPIRATORY: Symmetrical chest expansion, normal respiratory effort, clear to auscultation bilaterally  GASTROINTESTINAL: Soft, non tender, inj site lipodystrophy  MUSCULOSKELETAL: no cyanosis, normal muscle mass, no edema in lower extremities  SKIN: Warm, dry, no lesions   NEUROLOGIC: Awake, alert, and communicating appropriately       OUTSIDE RECORDS: reviewed. Pertinent positives summarized in HPI    LABS:   12/22 A1c 8.4, free T4 0.9, TSH 1.48, creatinine " urine random 155  9/22 -glucose 135, C-peptide 0.48, A1c 8.1    6/22 -A1c 8.4,   3/22 -A1c 8.5, MANSOOR <5, IA-2 and Zn8 neg    12/21 -calcium 9.2, creatinine 1.16, LFTs normal, GFR over 60, urine microalbumin 10, , HDL 51, triglycerides 124, A1c 8.2    No results found for: HGBA1C, GLUCOSE, NA, K, CO2, CL, BUN, EGFR, CREATININE, CA, ALT, CHOL, HDL, LDL, TRIG, TSH    Imaging:     ASSESSMENT and PLAN    Akin Daily is a 72 y.o. male with YANCI complicated by hyperlipidemia    1. Diabetes Mellitus: YANCI with hyperglycemia and hypoglycemia/long-term insulin use  A1c 8.4 % elevated.    It appears that post correction hyperglycemia skewing his A1c  Higher.  His breakfast and lunch are closer could be causing insulin stacking and contributing to postprandial hyperglycemia    Hypoglycemia is a serious risk of insulin and needs close monitoring    Low C-peptide. MANSOOR, IA2, ZnT8 antibodies were negative, continue insulin    Recommendations based on review of CGM  Continue Basaglar 18 units daily  He finds carb counting difficult and prefers to use fixed insulin  Stressed fixed dose insulin will work if has carb consistent meals  Continue 8 units for breakfast and dinner, lower to 4 to 6 units for lunch.  To increase or decrease 1 to 2 units based on the carb content of the meal  To take 2 units for bedtime snack if not hypoglycemic  Reinforced to use low insulin correction scale  Continue NovoLog with breakfast and dinner.  Reviewed portion sizes for fruits, written information given at the visit  Stressed to add protein with each meal and lower the carb content  To maintain the log and bring to the visit to review    Hypoglycemia 15-15    Advised pt to contact the office for further medication adjustment if blood glucose out of target range for 3-5 days below 70 or above 250    2.  Hypertension:   Urine micro albumin test was not appropriately done repeat at next visit    3.  Hyperlipidemia: LDL not at goal  Statin, Zetia  could not tolerate muscle leg cramps  Follows PCP, advised to forward results    4. Thyroid nodules  Stable left thyroid gland nodules and colloid cyst - Last ultrasound 2019  Thyroid function test normal, check in a year   thyroid ultrasound at next visit    5.  Lipohypertrophy  Rotating sites, reviewed alternative injection sites      Several elements of diabetes self management were reviewed including, but not limited to the importance of blood sugar monitoring, symptoms and treatment of hypoglycemia and hyperglycemia/A1c goals, compliance with medications, diet, exercise, lifestyle issues, surveillance of eyes and feet and need for routine follow-up with ophthalmology and podiatry.    Pt is aware of alternatives of therapy, risks and benefits and accepts risk of default.      I have answered all of my patient's questions to the best of my ability. To call us with any changes      Orders Placed This Encounter   Procedures   • ULTRASOUND THYROID   • Hemoglobin A1c   • Microalbumin/Creatinine Ur Random       Return to office in 3 months or earlier if issues arise     This patient has been dictated using speech recognition software. Inadvertent speech recognition errors should be disregarded. Please do not hesitate to call the office for clarification.

## 2023-01-25 ENCOUNTER — TRANSCRIBE ORDERS (OUTPATIENT)
Dept: SCHEDULING | Age: 73
End: 2023-01-25

## 2023-01-25 DIAGNOSIS — M54.16 RADICULOPATHY, LUMBAR REGION: ICD-10-CM

## 2023-01-25 DIAGNOSIS — M48.062 SPINAL STENOSIS, LUMBAR REGION WITH NEUROGENIC CLAUDICATION: Primary | ICD-10-CM

## 2023-02-01 ENCOUNTER — HOSPITAL ENCOUNTER (OUTPATIENT)
Dept: RADIOLOGY | Age: 73
Discharge: HOME | End: 2023-02-01
Attending: STUDENT IN AN ORGANIZED HEALTH CARE EDUCATION/TRAINING PROGRAM
Payer: MEDICARE

## 2023-02-01 DIAGNOSIS — M48.062 SPINAL STENOSIS, LUMBAR REGION WITH NEUROGENIC CLAUDICATION: ICD-10-CM

## 2023-02-01 DIAGNOSIS — M54.16 RADICULOPATHY, LUMBAR REGION: ICD-10-CM

## 2023-03-30 ENCOUNTER — TELEPHONE (OUTPATIENT)
Dept: ENDOCRINOLOGY | Facility: CLINIC | Age: 73
End: 2023-03-30
Payer: MEDICARE

## 2023-03-31 ENCOUNTER — APPOINTMENT (OUTPATIENT)
Dept: LAB | Facility: CLINIC | Age: 73
End: 2023-03-31
Attending: INTERNAL MEDICINE
Payer: MEDICARE

## 2023-03-31 DIAGNOSIS — Z79.4 TYPE 2 DIABETES MELLITUS WITH HYPERGLYCEMIA, WITH LONG-TERM CURRENT USE OF INSULIN (CMS/HCC): ICD-10-CM

## 2023-03-31 DIAGNOSIS — E11.65 TYPE 2 DIABETES MELLITUS WITH HYPERGLYCEMIA, WITH LONG-TERM CURRENT USE OF INSULIN (CMS/HCC): ICD-10-CM

## 2023-03-31 LAB
ALBUMIN/CREAT UR: 4.5 UG/MG
CREAT UR-MCNC: 242.7 MG/DL
EST. AVERAGE GLUCOSE BLD GHB EST-MCNC: 206 MG/DL
HBA1C MFR BLD HPLC: 8.8 %
MICROALBUMIN UR-MCNC: 11 MG/L

## 2023-03-31 PROCEDURE — 83036 HEMOGLOBIN GLYCOSYLATED A1C: CPT

## 2023-03-31 PROCEDURE — 36415 COLL VENOUS BLD VENIPUNCTURE: CPT

## 2023-03-31 PROCEDURE — 82570 ASSAY OF URINE CREATININE: CPT

## 2023-04-03 PROBLEM — M54.41 ACUTE RIGHT-SIDED LOW BACK PAIN WITH RIGHT-SIDED SCIATICA: Status: ACTIVE | Noted: 2023-01-17

## 2023-04-03 RX ORDER — CYCLOBENZAPRINE HCL 10 MG
TABLET ORAL
COMMUNITY
Start: 2023-01-19 | End: 2024-08-29

## 2023-04-03 RX ORDER — INSULIN ASPART 100 [IU]/ML
INJECTION, SOLUTION INTRAVENOUS; SUBCUTANEOUS
COMMUNITY
End: 2023-06-22 | Stop reason: SDUPTHER

## 2023-04-03 RX ORDER — CYCLOBENZAPRINE HCL 10 MG
10 TABLET ORAL
COMMUNITY
Start: 2023-01-19 | End: 2024-08-29

## 2023-04-03 RX ORDER — METHYLPREDNISOLONE 4 MG/1
TABLET ORAL
COMMUNITY
Start: 2023-01-17 | End: 2024-08-29

## 2023-04-03 RX ORDER — INSULIN GLARGINE 100 [IU]/ML
INJECTION, SOLUTION SUBCUTANEOUS
COMMUNITY
End: 2023-06-22

## 2023-04-03 RX ORDER — METHYLPREDNISOLONE 4 MG/1
TABLET ORAL SEE ADMIN INSTRUCTIONS
COMMUNITY
Start: 2023-01-17 | End: 2024-08-29

## 2023-04-04 ENCOUNTER — OFFICE VISIT (OUTPATIENT)
Dept: ENDOCRINOLOGY | Facility: CLINIC | Age: 73
End: 2023-04-04
Payer: MEDICARE

## 2023-04-04 VITALS
TEMPERATURE: 97.8 F | WEIGHT: 211 LBS | BODY MASS INDEX: 27.08 KG/M2 | HEART RATE: 70 BPM | HEIGHT: 74 IN | SYSTOLIC BLOOD PRESSURE: 118 MMHG | RESPIRATION RATE: 18 BRPM | DIASTOLIC BLOOD PRESSURE: 80 MMHG | OXYGEN SATURATION: 95 %

## 2023-04-04 DIAGNOSIS — E13.9 LADA (LATENT AUTOIMMUNE DIABETES IN ADULTS), MANAGED AS TYPE 1 (CMS/HCC): ICD-10-CM

## 2023-04-04 DIAGNOSIS — E04.2 MULTIPLE THYROID NODULES: Primary | ICD-10-CM

## 2023-04-04 DIAGNOSIS — I10 HYPERTENSION, UNSPECIFIED TYPE: ICD-10-CM

## 2023-04-04 DIAGNOSIS — E65 LIPOHYPERTROPHY: ICD-10-CM

## 2023-04-04 DIAGNOSIS — E16.2 HYPOGLYCEMIA: ICD-10-CM

## 2023-04-04 DIAGNOSIS — Z79.4 LONG-TERM INSULIN USE (CMS/HCC): ICD-10-CM

## 2023-04-04 DIAGNOSIS — E78.5 HYPERLIPIDEMIA, UNSPECIFIED HYPERLIPIDEMIA TYPE: ICD-10-CM

## 2023-04-04 PROCEDURE — G8754 DIAS BP LESS 90: HCPCS | Performed by: INTERNAL MEDICINE

## 2023-04-04 PROCEDURE — G8752 SYS BP LESS 140: HCPCS | Performed by: INTERNAL MEDICINE

## 2023-04-04 PROCEDURE — 95251 CONT GLUC MNTR ANALYSIS I&R: CPT | Performed by: INTERNAL MEDICINE

## 2023-04-04 PROCEDURE — 99214 OFFICE O/P EST MOD 30 MIN: CPT | Performed by: INTERNAL MEDICINE

## 2023-04-04 NOTE — PROGRESS NOTES
REASON FOR VISIT:   Akin Daily is a 72 y.o. male who presents today for evaluation and management of his diabetes.   A consultation was requested by Ana Delgado MD.     Previous patient at [facility]. Interim followed Dr. Gregorio Coburn NP    04/04/23    Patient was accompanied by his wife who provided additional history.  No issues interim  Breakfast added yogurt daily  Had 3 steroid inj, last was a month ago, High sugars lasting a week into 400's  Sun dinner - stromboli, chicken steak BG inc to >400,   He has large portion sizes, sweet tooth  He eats bedtime snack if sugars under 150, bedtime snacks  Cereal  Some of the hyperglycemia post dinner is due to not taking NovoLog Premeal if blood glucose under 100    DIABETES HISTORY:  Type of Diabetes: Initially type 2 in 2005 and as YANCI by Dr. Esquivel in 2014 (by blood test per wife). Since then on insulin  Prior DKA: no    Current diabetes treatment:   Basaglar 18 units AM  Novolog - BF ~ 10 units, lunch 10 units, dinner 10, 15 units for rice  Correction scale sugar >150 1 unit for every 50 mg - does not use often    Past regimen:  Metformin, Jardiance  Stopped Metformin with belching    Continuous glucose monitor: Dexcom G6  CGM downloaded and values reviewed : Yes  Current regime: above  No. of days of tracings available 14  Average blood glucose 216  Stand dev 26  % above target 73  % at target 26  % below target <1  Continuous Glucose Monitoring (CGM) Interpretation  I reviewed 72 hours + of CGM data.   There is a trend for blood sugar - post breakfast hypoglycemia, at times post dinner hypoglycemia, hyperglycemia post hypoglycemia correction noted.  Recommendations: below      COMPLICATIONS OF DIABETES:  Eye disease in the past: no DR   Last eye exam: 4/20 - reinforced    Nephropathy:  no    Peripheral neuropathy: no   History of foot sores/infections/amputations: no   Podiatrist     Macrovascular disease: Hyperlipidemia, (stopped statin for muscle  "aches)      DIET/EXERCISE HISTORY:  Diet: 2-3 meals/day, lunch is very light peanut butter with cracker.  No protein for BF, BF is mainly carb cereal, rice inc BG to 300's  Snack: 2 small Ice cream bars daily, Peanut at night, or cracker if BS <180  Exercise: no      GLYCEMIC CONTROL:  Glucometer: One Touch  Frequency of glucose monitoring: Follows Dexcom G6  Review of home blood glucose readings:  Before breakfast:    After breakfast:   Before lunch:   After lunch:   Before dinner:   After dinner:   Bedtime:   2 am:   Hypoglycemic awareness: yes  Frequency of lows:     2 children physicians, foot and jamie specialist and dentist      Past Medical History:   Diagnosis Date   • Benign prostatic hyperplasia 9/10/2021   • Diabetes mellitus type I (CMS/HCC)    • Gout, unspecified 9/10/2021   • Obstructive sleep apnea (adult) (pediatric) 9/10/2021   • Other hyperlipidemia 2/11/2019   • Thyroid nodule 12/10/2019     History reviewed. No pertinent surgical history.  Family History   Problem Relation Age of Onset   • Diabetes Biological Mother    • Heart attack Biological Mother    • Lung cancer Biological Father    • Colon cancer Biological Brother    • Diabetes Biological Brother    • Diabetes Maternal Grandmother      Current Outpatient Medications   Medication Sig Dispense Refill   • BASAGLAR KWIKPEN U-100 INSULIN 100 unit/mL (3 mL) pen Inject 18 Units under the skin daily. 10 pen 3   • DEXCOM G6 SENSOR device To monitor sugars 9 each 3   • DEXCOM G6 TRANSMITTER device Change every 90 days 1 each 3   • FLUoxetine (PROzac) 20 mg capsule Take 20 mg by mouth daily.     • NovoLOG Flexpen U-100 Insulin 100 unit/mL (3 mL) pen To take NovoLog with each meal as instructed including correction, maximum daily total 60 units 20 pen 3   • pen needle, diabetic (BD MILLIE 2ND GEN PEN NEEDLE) 32 gauge x 5/32\" needle To inject upto 4 times a day 400 each 3   • traZODone 50 mg tablet Take 50 mg by mouth.     • cyclobenzaprine (FLEXERIL) 10 " mg tablet Take 10 mg by mouth.     • cyclobenzaprine (FLEXERIL) 10 mg tablet TAKE 1 TABLET BY MOUTH 2 TIMES A DAY AS NEEDED FOR MUSCLE SPASMS FOR UP TO 10 DAYS.     • insulin aspart U-100 (NovoLOG Flexpen U-100 Insulin) 100 unit/mL (3 mL) pen NovoLOG FlexPen     • insulin glargine U-100 (BASAGLAR KWIKPEN U-100 INSULIN) 100 unit/mL (3 mL) pen Basaglar KwikPen     • methylPREDNISolone (MEDROL DOSEPACK) 4 mg tablet See admin instr.     • methylPREDNISolone (MEDROL DOSEPACK) 4 mg tablet TAKE 6 TABLETS ON DAY 1 AS DIRECTED ON PACKAGE AND DECREASE BY 1 TAB EACH DAY FOR A TOTAL OF 6 DAYS       No current facility-administered medications for this visit.     Allergies   Allergen Reactions   • Poison Ivy Extract Rash   • Hay Fever And Allergy Relief Other (see comments)   • Statins-Hmg-Coa Reductase Inhibitors      Other reaction(s): leg pain     Social History     Socioeconomic History   • Marital status:      Spouse name: Not on file   • Number of children: Not on file   • Years of education: Not on file   • Highest education level: Not on file   Occupational History   • Not on file   Tobacco Use   • Smoking status: Never   • Smokeless tobacco: Never   Vaping Use   • Vaping status: Not on file   Substance and Sexual Activity   • Alcohol use: Never   • Drug use: Never   • Sexual activity: Not on file   Other Topics Concern   • Not on file   Social History Narrative   • Not on file     Social Determinants of Health     Financial Resource Strain: Not on file   Food Insecurity: Not on file   Transportation Needs: Not on file   Physical Activity: Not on file   Stress: Not on file   Social Connections: Not on file   Intimate Partner Violence: Not on file   Housing Stability: Not on file        ROS:  The complete review of systems is otherwise negative except as noted in HPI.    PHYSICAL EXAM:  Vitals:    04/04/23 1140   BP: 118/80   BP Location: Right upper arm   Patient Position: Sitting   Pulse: 70   Resp: 18   Temp:  "36.6 °C (97.8 °F)   TempSrc: Temporal   SpO2: 95%   Weight: 95.7 kg (211 lb)   Height: 1.88 m (6' 2\")       Body mass index is 27.09 kg/m².    Wt Readings from Last 3 Encounters:   04/04/23 95.7 kg (211 lb)   12/29/22 98.4 kg (217 lb)   09/21/22 97.5 kg (215 lb)        GENERAL: Well developed, well nourished, in no acute distress  EYES: conjunctiva pink and moist, no proptosis  NECK/LYMPHATIC: Supple, nl cervical lymphadenopathy, acanthosis no   THYROID: thyroid palpable, no distinct nodules palpated, non tender on my exam  CARDIOVASCULAR: Regular rate and Regular rhythm  RESPIRATORY: Symmetrical chest expansion, normal respiratory effort, clear to auscultation bilaterally  GASTROINTESTINAL: Soft, non tender, inj site lipodystrophy  MUSCULOSKELETAL: no cyanosis, normal muscle mass, no edema in lower extremities  SKIN: Warm, dry, no lesions   NEUROLOGIC: Awake, alert, and communicating appropriately       OUTSIDE RECORDS: reviewed. Pertinent positives summarized in HPI    LABS:   12/22 A1c 8.4, free T4 0.9, TSH 1.48, creatinine urine random 155  9/22 -glucose 135, C-peptide 0.48, A1c 8.1    6/22 -A1c 8.4,   3/22 -A1c 8.5, MANSOOR <5, IA-2 and Zn8 neg    12/21 -calcium 9.2, creatinine 1.16, LFTs normal, GFR over 60, urine microalbumin 10, , HDL 51, triglycerides 124, A1c 8.2    Hemoglobin A1C   Date Value Ref Range Status   03/31/2023 8.8 (H) <5.7 % Final     Comment:     In the absence of an established diagnosis of Diabetes Mellitus, HbA1c levels between 5.7% and 6.4% are indicative of increased risk for developing Diabetes(Pre-Diabetes). Levels of 6.5% or greater are diagnostic for Diabetes Mellitus.       Imaging:     ASSESSMENT and PLAN    Akin Daily is a 72 y.o. male with YANCI complicated by hyperlipidemia    1. Diabetes Mellitus: YANCI with hyperglycemia and hypoglycemia/long-term insulin use/upcoming steroid  A1c 8.4 % elevated.    It appears that post correction hyperglycemia skewing his A1c  Higher.  His " breakfast and lunch are closer could be causing insulin stacking and contributing to postprandial hyperglycemia    Hypoglycemia is a serious risk of insulin and needs close monitoring    Low C-peptide. MANSOOR, IA2, ZnT8 antibodies were negative, continue insulin    Recommendations based on review of CGM  Continue Basaglar 18 units daily  Lower NovoLog for breakfast and lunch 8 units, continue 10 units with dinner   JEREMÍAS  Advised to take NovoLog insulin if sugar is greater than 70 and having a meal.  Not to wait until hyperglycemic  Stressed to add protein with each meal and lower the carb content  To maintain the log and bring to the visit to review  Hypoglycemia 15-15  We discussed steroid-induced hyperglycemia, to maintain hydration, low-carb diet.  To increase NovoLog to 12 units with each meal when he receives steroids.  Advised pt to contact the office for further medication adjustment if blood glucose out of target range for 3-5 days below 70 or above 250  We discussed changing to Dexcom G7    2.  Hypertension:   Urine micro albumin test was not appropriately done repeat at next visit    3.  Hyperlipidemia: LDL not at goal  Statin, Zetia could not tolerate muscle leg cramps  Follows PCP, advised to forward results    4. Thyroid nodules  Stable left thyroid gland nodules and colloid cyst - Last ultrasound 2019  Thyroid function test normal, check in a year   thyroid ultrasound at next visit    5.  Lipohypertrophy  Rotating sites, reviewed alternative injection and CGM sites      Several elements of diabetes self management were reviewed including, but not limited to the importance of blood sugar monitoring, symptoms and treatment of hypoglycemia and hyperglycemia/A1c goals, compliance with medications, diet, exercise, lifestyle issues, surveillance of eyes and feet and need for routine follow-up with ophthalmology and podiatry.    Pt is aware of alternatives of therapy, risks and benefits and accepts risk of  default.      I have answered all of my patient's questions to the best of my ability. To call us with any changes      No orders of the defined types were placed in this encounter.      Return to office in 3 months or earlier if issues arise     This patient has been dictated using speech recognition software. Inadvertent speech recognition errors should be disregarded. Please do not hesitate to call the office for clarification.

## 2023-04-17 ENCOUNTER — HOSPITAL ENCOUNTER (OUTPATIENT)
Dept: RADIOLOGY | Facility: CLINIC | Age: 73
Discharge: HOME | End: 2023-04-17
Attending: INTERNAL MEDICINE
Payer: MEDICARE

## 2023-04-17 DIAGNOSIS — Z79.4 TYPE 2 DIABETES MELLITUS WITH HYPERGLYCEMIA, WITH LONG-TERM CURRENT USE OF INSULIN (CMS/HCC): ICD-10-CM

## 2023-04-17 DIAGNOSIS — E11.65 TYPE 2 DIABETES MELLITUS WITH HYPERGLYCEMIA, WITH LONG-TERM CURRENT USE OF INSULIN (CMS/HCC): ICD-10-CM

## 2023-04-17 PROCEDURE — 76536 US EXAM OF HEAD AND NECK: CPT

## 2023-05-22 ENCOUNTER — TELEPHONE (OUTPATIENT)
Dept: ENDOCRINOLOGY | Facility: CLINIC | Age: 73
End: 2023-05-22

## 2023-05-22 NOTE — TELEPHONE ENCOUNTER
[place] at [place] and wants to know if they can get a letter to show customs for his insulin he has to bring with him. Pt wants to know if the letter can be emailed to [email or web address]

## 2023-06-22 DIAGNOSIS — E11.65 UNCONTROLLED TYPE 2 DIABETES MELLITUS WITH HYPERGLYCEMIA (CMS/HCC): ICD-10-CM

## 2023-06-22 DIAGNOSIS — E13.9 LADA (LATENT AUTOIMMUNE DIABETES IN ADULTS), MANAGED AS TYPE 1 (CMS/HCC): ICD-10-CM

## 2023-06-22 DIAGNOSIS — E11.65 UNCONTROLLED TYPE 2 DIABETES MELLITUS WITH HYPERGLYCEMIA (CMS/HCC): Primary | ICD-10-CM

## 2023-06-22 RX ORDER — BLOOD-GLUCOSE SENSOR
EACH MISCELLANEOUS
Qty: 9 EACH | Refills: 3 | Status: CANCELLED | OUTPATIENT
Start: 2023-06-22

## 2023-06-22 RX ORDER — INSULIN ASPART 100 [IU]/ML
INJECTION, SOLUTION INTRAVENOUS; SUBCUTANEOUS
Qty: 15 ML | Refills: 3 | Status: SHIPPED | OUTPATIENT
Start: 2023-06-22 | End: 2024-02-20 | Stop reason: SDUPTHER

## 2023-06-22 RX ORDER — BLOOD-GLUCOSE TRANSMITTER
EACH MISCELLANEOUS
Qty: 1 EACH | Refills: 3 | Status: SHIPPED | OUTPATIENT
Start: 2023-06-22 | End: 2023-08-09

## 2023-06-22 RX ORDER — INSULIN ASPART 100 [IU]/ML
INJECTION, SOLUTION INTRAVENOUS; SUBCUTANEOUS
Qty: 15 ML | Status: CANCELLED | OUTPATIENT
Start: 2023-06-22

## 2023-06-22 RX ORDER — PEN NEEDLE, DIABETIC 30 GX3/16"
NEEDLE, DISPOSABLE MISCELLANEOUS
Qty: 400 EACH | Refills: 3 | Status: CANCELLED | OUTPATIENT
Start: 2023-06-22

## 2023-06-22 RX ORDER — BLOOD-GLUCOSE TRANSMITTER
EACH MISCELLANEOUS
Qty: 1 EACH | Refills: 3 | Status: CANCELLED | OUTPATIENT
Start: 2023-06-22

## 2023-06-22 RX ORDER — INSULIN GLARGINE 100 [IU]/ML
INJECTION, SOLUTION SUBCUTANEOUS
Qty: 18 ML | Refills: 3 | Status: SHIPPED | OUTPATIENT
Start: 2023-06-22 | End: 2024-02-20 | Stop reason: SDUPTHER

## 2023-06-22 RX ORDER — INSULIN GLARGINE 100 [IU]/ML
INJECTION, SOLUTION SUBCUTANEOUS
Qty: 15 ML | OUTPATIENT
Start: 2023-06-22

## 2023-06-22 RX ORDER — PEN NEEDLE, DIABETIC 30 GX3/16"
NEEDLE, DISPOSABLE MISCELLANEOUS
Qty: 400 EACH | Refills: 3 | Status: SHIPPED | OUTPATIENT
Start: 2023-06-22 | End: 2024-02-20 | Stop reason: SDUPTHER

## 2023-06-22 RX ORDER — BLOOD-GLUCOSE SENSOR
EACH MISCELLANEOUS
Qty: 9 EACH | Refills: 3 | Status: SHIPPED | OUTPATIENT
Start: 2023-06-22 | End: 2023-08-09

## 2023-06-23 NOTE — TELEPHONE ENCOUNTER
A fax was received from [facility] to provide specific instructions for Dexcom sensor per Medicare

## 2023-07-06 ENCOUNTER — APPOINTMENT (OUTPATIENT)
Dept: LAB | Facility: CLINIC | Age: 73
End: 2023-07-06
Attending: INTERNAL MEDICINE
Payer: MEDICARE

## 2023-07-06 DIAGNOSIS — E13.9 LADA (LATENT AUTOIMMUNE DIABETES IN ADULTS), MANAGED AS TYPE 1 (CMS/HCC): ICD-10-CM

## 2023-07-06 PROCEDURE — 36415 COLL VENOUS BLD VENIPUNCTURE: CPT

## 2023-07-06 PROCEDURE — 83036 HEMOGLOBIN GLYCOSYLATED A1C: CPT

## 2023-07-07 LAB
EST. AVERAGE GLUCOSE BLD GHB EST-MCNC: 189 MG/DL
HBA1C MFR BLD: 8.2 %

## 2023-07-12 ENCOUNTER — TELEPHONE (OUTPATIENT)
Dept: ENDOCRINOLOGY | Facility: CLINIC | Age: 73
End: 2023-07-12
Payer: MEDICARE

## 2023-07-12 PROBLEM — F41.8 DEPRESSION WITH ANXIETY: Status: ACTIVE | Noted: 2021-09-10

## 2023-07-12 PROBLEM — M79.10 MYALGIA: Status: ACTIVE | Noted: 2023-04-19

## 2023-07-12 PROBLEM — G47.33 OBSTRUCTIVE SLEEP APNEA (ADULT) (PEDIATRIC): Status: ACTIVE | Noted: 2021-09-10

## 2023-07-12 RX ORDER — METAXALONE 800 MG/1
800 TABLET ORAL 3 TIMES DAILY
COMMUNITY
Start: 2023-05-18 | End: 2024-08-29

## 2023-07-12 RX ORDER — MELOXICAM 15 MG/1
15 TABLET ORAL DAILY
COMMUNITY
Start: 2023-05-18 | End: 2024-08-29

## 2023-08-08 PROBLEM — M25.562 LEFT KNEE PAIN: Status: ACTIVE | Noted: 2023-07-14

## 2023-08-09 ENCOUNTER — OFFICE VISIT (OUTPATIENT)
Dept: ENDOCRINOLOGY | Facility: CLINIC | Age: 73
End: 2023-08-09
Payer: MEDICARE

## 2023-08-09 VITALS
WEIGHT: 212 LBS | OXYGEN SATURATION: 96 % | HEIGHT: 74 IN | DIASTOLIC BLOOD PRESSURE: 78 MMHG | HEART RATE: 71 BPM | BODY MASS INDEX: 27.21 KG/M2 | RESPIRATION RATE: 18 BRPM | SYSTOLIC BLOOD PRESSURE: 122 MMHG | TEMPERATURE: 98.5 F

## 2023-08-09 DIAGNOSIS — E78.5 HYPERLIPIDEMIA, UNSPECIFIED HYPERLIPIDEMIA TYPE: ICD-10-CM

## 2023-08-09 DIAGNOSIS — Z79.4 LONG-TERM INSULIN USE (CMS/HCC): ICD-10-CM

## 2023-08-09 DIAGNOSIS — I10 HYPERTENSION, UNSPECIFIED TYPE: ICD-10-CM

## 2023-08-09 DIAGNOSIS — E04.2 MULTIPLE THYROID NODULES: Primary | ICD-10-CM

## 2023-08-09 DIAGNOSIS — E16.2 HYPOGLYCEMIA: ICD-10-CM

## 2023-08-09 DIAGNOSIS — E13.9 LADA (LATENT AUTOIMMUNE DIABETES IN ADULTS), MANAGED AS TYPE 1 (CMS/HCC): ICD-10-CM

## 2023-08-09 DIAGNOSIS — E65 LIPOHYPERTROPHY: ICD-10-CM

## 2023-08-09 PROCEDURE — 99214 OFFICE O/P EST MOD 30 MIN: CPT | Performed by: INTERNAL MEDICINE

## 2023-08-09 PROCEDURE — G8752 SYS BP LESS 140: HCPCS | Performed by: INTERNAL MEDICINE

## 2023-08-09 PROCEDURE — 95251 CONT GLUC MNTR ANALYSIS I&R: CPT | Performed by: INTERNAL MEDICINE

## 2023-08-09 PROCEDURE — G8754 DIAS BP LESS 90: HCPCS | Performed by: INTERNAL MEDICINE

## 2023-08-09 RX ORDER — BLOOD-GLUCOSE,RECEIVER,CONT
EACH MISCELLANEOUS
Qty: 1 EACH | Refills: 0 | Status: SHIPPED | OUTPATIENT
Start: 2023-08-09 | End: 2023-11-09

## 2023-08-09 RX ORDER — BLOOD-GLUCOSE SENSOR
EACH MISCELLANEOUS
Qty: 9 EACH | Refills: 3 | Status: SHIPPED | OUTPATIENT
Start: 2023-08-09 | End: 2024-08-16

## 2023-08-09 NOTE — PROGRESS NOTES
REASON FOR VISIT:   Akin Daily is a 73 y.o. male who presents today for evaluation and management of his diabetes.   A consultation was requested by Ana Delgado MD.     Previous patient at [facility]. Interim followed Dr. Gregorio Coburn NP    08/09/23   Patient was accompanied by his wife who provided additional history.  No issues interim  BF better, blood glucoses better, needing less insulin    Breakfast added yogurt daily  Had 3 steroid inj, last was a month ago, High sugars lasting a week into 400's  Sun dinner - stromboli, chicken steak BG inc to >400,   He has large portion sizes, sweet tooth  He eats bedtime snack if sugars under 150, bedtime snacks  Cereal  Some of the hyperglycemia post dinner is due to not taking NovoLog Premeal if blood glucose under 100    DIABETES HISTORY:  Type of Diabetes: Initially type 2 in 2005 and as YANCI by Dr. Esquivel in 2014 (by blood test per wife). Since then on insulin  Prior DKA: no    Current diabetes treatment:   Basaglar 18 units AM  Novolog - BF ~ 6-7 units, lunch 10-11 units, dinner 7-10 units, 15 units for rice  Correction scale sugar >150 1 unit for every 50 mg - does not use often    Past regimen:  Metformin, Jardiance  Stopped Metformin with belching    Continuous glucose monitor:   CGM downloaded and values reviewed : Yes  Current regime: above  No. of days of tracings available 14  Average blood glucose 171  Stand dev 33  % above target 37  % at target 62  % below target 1  Continuous Glucose Monitoring (CGM) Interpretation  I reviewed 72 hours + of CGM data. There is a trend for blood sugar -hypoglycemia 2-3 afternoon, occasional post correction postbreakfast hyperglycemia, post dinner hyperglycemia, excessive post correction hyperglycemia   Recommendations: below      COMPLICATIONS OF DIABETES:  Eye disease in the past: no    Last eye exam: 4/20 - reinforced    Nephropathy:  no    Peripheral neuropathy: no   History of foot sores/infections/amputations:  no   Podiatrist     Macrovascular disease: Hyperlipidemia, (stopped statin for muscle aches)      DIET/EXERCISE HISTORY:  Diet: 2-3 meals/day, lunch is very light peanut butter with cracker.  No protein for BF, BF is mainly carb cereal, rice inc BG to 300's  Snack: 2 small Ice cream bars daily, Peanut at night, or cracker if BS <180  Exercise: no      GLYCEMIC CONTROL:  Glucometer: One Touch  Frequency of glucose monitoring: Follows Dexcom G6  Review of home blood glucose readings:  Before breakfast:    After breakfast:   Before lunch:   After lunch:   Before dinner:   After dinner:   Bedtime:   2 am:   Hypoglycemic awareness: yes  Frequency of lows:     2 children physicians, foot and jamie specialist and dentist      Past Medical History:   Diagnosis Date   • Benign prostatic hyperplasia 9/10/2021   • Diabetes mellitus type I (CMS/HCC)    • Gout, unspecified 9/10/2021   • Obstructive sleep apnea (adult) (pediatric) 9/10/2021   • Other hyperlipidemia 2/11/2019   • Thyroid nodule 12/10/2019     History reviewed. No pertinent surgical history.  Family History   Problem Relation Age of Onset   • Diabetes Biological Mother    • Heart attack Biological Mother    • Lung cancer Biological Father    • Colon cancer Biological Brother    • Diabetes Biological Brother    • Diabetes Maternal Grandmother      Current Outpatient Medications   Medication Sig Dispense Refill   • BASAGLAR KWIKPEN U-100 INSULIN 100 unit/mL (3 mL) pen Inject 18 units under the skin daily 18 mL 3   • blood-glucose meter,continuous (DEXCOM G7 ) misc  To check sugars 1 each 0   • blood-glucose sensor (DEXCOM G7 SENSOR) device device To monitor sugars 9 each 3   • FLUoxetine (PROzac) 20 mg capsule Take 20 mg by mouth daily.     • insulin aspart U-100 (NovoLOG FlexPen U-100 Insulin) 100 unit/mL (3 mL) pen To take NovoLog with each meal as instructed including correction, maximum daily total 60 units 15 mL 3   • meloxicam (MOBIC) 15 mg  "tablet Take 15 mg by mouth daily.     • pen needle, diabetic (BD MILLIE 2ND GEN PEN NEEDLE) 32 gauge x 5/32\" needle To inject upto 4 times a day 400 each 3   • traZODone 50 mg tablet Take 50 mg by mouth.     • cyclobenzaprine (FLEXERIL) 10 mg tablet Take 10 mg by mouth.     • cyclobenzaprine (FLEXERIL) 10 mg tablet TAKE 1 TABLET BY MOUTH 2 TIMES A DAY AS NEEDED FOR MUSCLE SPASMS FOR UP TO 10 DAYS.     • metaxalone (SKELAXIN) 800 mg tablet Take 800 mg by mouth 3 times daily.     • methylPREDNISolone (MEDROL DOSEPACK) 4 mg tablet See admin instr.     • methylPREDNISolone (MEDROL DOSEPACK) 4 mg tablet TAKE 6 TABLETS ON DAY 1 AS DIRECTED ON PACKAGE AND DECREASE BY 1 TAB EACH DAY FOR A TOTAL OF 6 DAYS       No current facility-administered medications for this visit.     Allergies   Allergen Reactions   • Poison Ivy Extract Rash   • Hay Fever And Allergy Relief Other (see comments)   • Statins-Hmg-Coa Reductase Inhibitors      Other reaction(s): leg pain     Social History     Socioeconomic History   • Marital status:      Spouse name: Not on file   • Number of children: Not on file   • Years of education: Not on file   • Highest education level: Not on file   Occupational History   • Not on file   Tobacco Use   • Smoking status: Never   • Smokeless tobacco: Never   Substance and Sexual Activity   • Alcohol use: Never   • Drug use: Never   • Sexual activity: Not on file   Other Topics Concern   • Not on file   Social History Narrative   • Not on file     Social Determinants of Health     Financial Resource Strain: Not on file   Food Insecurity: Not on file   Transportation Needs: Not on file   Physical Activity: Not on file   Stress: Not on file   Social Connections: Not on file   Intimate Partner Violence: Not on file   Housing Stability: Not on file        ROS:  The complete review of systems is otherwise negative except as noted in HPI.    PHYSICAL EXAM:  Vitals:    08/09/23 1251   BP: 122/78   BP Location: Right " "upper arm   Patient Position: Sitting   Pulse: 71   Resp: 18   Temp: 36.9 °C (98.5 °F)   TempSrc: Temporal   SpO2: 96%   Weight: 96.2 kg (212 lb)   Height: 1.88 m (6' 2\")       Body mass index is 27.22 kg/m².    Wt Readings from Last 3 Encounters:   08/09/23 96.2 kg (212 lb)   04/04/23 95.7 kg (211 lb)   12/29/22 98.4 kg (217 lb)        GENERAL: Well developed, well nourished, in no acute distress  EYES: conjunctiva pink and moist, no proptosis  NECK/LYMPHATIC: Supple, nl cervical lymphadenopathy, acanthosis no   THYROID: thyroid palpable, no distinct nodules palpated, non tender on my exam  CARDIOVASCULAR: Regular rate and Regular rhythm  RESPIRATORY: Symmetrical chest expansion, normal respiratory effort, clear to auscultation bilaterally  GASTROINTESTINAL: Soft, non tender, inj site lipodystrophy  MUSCULOSKELETAL: no cyanosis, normal muscle mass, no edema in lower extremities  SKIN: Warm, dry, no lesions   NEUROLOGIC: Awake, alert, and communicating appropriately       OUTSIDE RECORDS: reviewed. Pertinent positives summarized in HPI    LABS:   12/22 A1c 8.4, free T4 0.9, TSH 1.48, creatinine urine random 155  9/22 -glucose 135, C-peptide 0.48, A1c 8.1    6/22 -A1c 8.4,   3/22 -A1c 8.5, MANSOOR <5, IA-2 and Zn8 neg    12/21 -calcium 9.2, creatinine 1.16, LFTs normal, GFR over 60, urine microalbumin 10, , HDL 51, triglycerides 124, A1c 8.2    Hemoglobin A1C   Date Value Ref Range Status   07/06/2023 8.2 (H) <5.7 % Final     Comment:     In the absence of an established diagnosis of Diabetes Mellitus, HbA1c levels between 5.7% and 6.4% are indicative of increased risk for developing Diabetes(Pre-Diabetes). Levels of 6.5% or greater are diagnostic for Diabetes Mellitus.   03/31/2023 8.8 (H) <5.7 % Final     Comment:     In the absence of an established diagnosis of Diabetes Mellitus, HbA1c levels between 5.7% and 6.4% are indicative of increased risk for developing Diabetes(Pre-Diabetes). Levels of 6.5% or greater " are diagnostic for Diabetes Mellitus.       Imaging:   Results for orders placed during the hospital encounter of 04/17/23    ULTRASOUND THYROID [[id number]]    Narrative  CLINICAL HISTORY:Type 2 diabetes mellitus with hyperglycemia. History of thyroid  nodules.    COMMENT:  Ultrasound examination of the thyroid gland is performed with grayscale and  color Doppler imaging.    COMPARISON: Prior images are currently unavailable for comparison.    RIGHT: The right thyroid lobe measures 5.1 x 2.1 x 2.0 cm. The right thyroid  lobe is homogeneous in echotexture with no discrete nodules identified.    ISTHMUS: The isthmus measures 3.5 mm in maximal AP dimension.  Isthmic  parenchyma is homogeneous with no nodules identified.    LEFT:  The left thyroid lobe measures 4.4 x 2.2 x 2.1 cm. The left thyroid lobe  is heterogeneous in echotexture with underlying nodules identified.  Nodule # 1  Location: Left upper pole  Size: 0.8 x 0.7 x 0.8 cm  Composition: Mixed cystic and solid  (1 point)  Echogenicity: isoechoic (1 point)  Shape: Wider-than-tall (0 points)  Margin: Ill-defined (0 points)  Echogenic foci: None or large comet-tail artifacts (0 points)  Halo: None  Vascularity: Present peripherally  Comparison: None available  TI-RADS category: TR3 (3 points): Mildly suspicious.    Nodule # 2  Location: Left midpole  Size: 0.7 x 0.3 x 0.1 cm  Composition: Mixed cystic and solid  (1 point)  Echogenicity: Solid and coarsely calcified  Shape: Wider-than-tall (0 points)  Margin: Ill-defined (0 points)  Echogenic foci: Macrocalcifications (1 point)  Halo: None  Vascularity: Present  Comparison: None available  TI-RADS category: TR2 (2 points): Not suspicious.    There is no evidence for hypervascularity of the gland. No cervical  lymphadenopathy.    --    Impression  Left thyroid nodules as described above. Follow-up should be based according to  TI RADS  criteria.    ----------------------------------------------------------------------------  TI-RADS recommendations from ACR White paper 2017  Composition:  Cystic or almost completely cystic = 0 points  Spongiform = 0 points  Mixed cystic and solid = 1 point  Solid or almost completely solid = 2 points  -  Echogenicity:  Anechoic = 0 points  Hyperechoic or isoechoic = 1 point  Hypoechoic = 2 points  Very hypoechoic = 3 points  -  Shape:  Wider than tall = 0 point  Taller than wide = 3 points  -  Margin:  Smooth = 0 point  Ill-defined = 0 point  Lobulated/irregular = 2 points  Extrathyroidal extension = 3 points  -  Echogenic foci:  None or large comet tail artifact = 0 points  macro-calcification = 1 point  Peripheral/rim calcifications = 2 points  Punctate echogenic foci = 3 points  ---  TI-RADS categories:  TR 1(0 points): Benign.  Recommendation: No fine-needle aspiration.  TR 2 (2 points): Not suspicious.  Recommendation: No fine-needle aspiration.  TR 3 (3 points): Mildly suspicious.  Recommendations:  Follow-up for nodules 1.5 cm or larger  Biopsy for nodules 2.5 cm or larger  TR 4 (4-6 points): Moderately suspicious.  Recommendations:  Follow-up for nodules 1 cm or larger  Biopsy for nodules 1.5 cm or larger  TR 5 (7 or more points): Highly suspicious.  Follow-up for nodules 0.5 cm or larger  Biopsy for nodules 1 cm or larger       ASSESSMENT and PLAN    Akin Daily is a 73 y.o. male with YANCI complicated by hyperlipidemia    1. Diabetes Mellitus: YANCI with hyperglycemia and hypoglycemia/long-term insulin use    Lab Results   Component Value Date    HGBA1C 8.2 (H) 07/06/2023   Low C-peptide. 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  Hypertension:   Urine micro albumin test was not appropriately done repeat at next visit    3.  Hyperlipidemia: LDL not at goal  Statin, Zetia could not tolerate muscle leg cramps  Follows PCP, advised to forward results    4. Thyroid nodules  Last ultrasound 2019 stable left thyroid gland nodule and colloidal cyst  2 subcentimeter thyroid nodules, based on TIRADS do not need follow-up  Repeat thyroid ultrasound if changes clinically      5.  Lipohypertrophy  Rotating sites, reviewed alternative injection and CGM sites      Several elements of diabetes self management were reviewed including, but not limited to the importance of blood sugar monitoring, symptoms and treatment of hypoglycemia and hyperglycemia/A1c goals, compliance with medications, diet, exercise, lifestyle issues, surveillance of eyes and feet and need for routine follow-up with ophthalmology and podiatry.    Pt is aware of alternatives of therapy, risks and benefits and accepts risk of default.      I have answered all of my patient's questions to the best of my ability. To call us with any changes      Orders Placed This Encounter   Procedures   • Hemoglobin A1c       Return to office in 3 months or earlier if issues arise     This patient has been dictated using speech recognition software. Inadvertent speech recognition errors should be disregarded. Please do not hesitate to call the office for clarification.

## 2023-11-08 DIAGNOSIS — E13.9 LADA (LATENT AUTOIMMUNE DIABETES IN ADULTS), MANAGED AS TYPE 1 (CMS/HCC): Primary | ICD-10-CM

## 2023-11-09 RX ORDER — MUPIROCIN 20 MG/G
OINTMENT TOPICAL SEE ADMIN INSTRUCTIONS
COMMUNITY
Start: 2023-10-24 | End: 2024-08-29

## 2023-11-09 RX ORDER — BLOOD-GLUCOSE,RECEIVER,CONT
EACH MISCELLANEOUS
Qty: 1 EACH | Refills: 0 | Status: SHIPPED | OUTPATIENT
Start: 2023-11-09

## 2023-12-01 ENCOUNTER — TELEPHONE (OUTPATIENT)
Dept: ENDOCRINOLOGY | Facility: CLINIC | Age: 73
End: 2023-12-01

## 2023-12-01 NOTE — TELEPHONE ENCOUNTER
Called patient to let him know he doesn't need a script to go to [facility] labs to get his blood work done.

## 2023-12-04 ENCOUNTER — APPOINTMENT (OUTPATIENT)
Dept: LAB | Facility: CLINIC | Age: 73
End: 2023-12-04
Attending: INTERNAL MEDICINE
Payer: COMMERCIAL

## 2023-12-04 DIAGNOSIS — E13.9 LADA (LATENT AUTOIMMUNE DIABETES IN ADULTS), MANAGED AS TYPE 1 (CMS/HCC): ICD-10-CM

## 2023-12-04 PROCEDURE — 36415 COLL VENOUS BLD VENIPUNCTURE: CPT

## 2023-12-04 PROCEDURE — 83036 HEMOGLOBIN GLYCOSYLATED A1C: CPT

## 2023-12-05 PROBLEM — M25.562 LEFT KNEE PAIN: Status: ACTIVE | Noted: 2023-07-14

## 2023-12-05 PROBLEM — F41.8 DEPRESSION WITH ANXIETY: Status: ACTIVE | Noted: 2021-09-10

## 2023-12-05 PROBLEM — E78.5 HYPERLIPIDEMIA, UNSPECIFIED: Status: ACTIVE | Noted: 2021-09-10

## 2023-12-05 LAB
EST. AVERAGE GLUCOSE BLD GHB EST-MCNC: 171 MG/DL
HBA1C MFR BLD: 7.6 %

## 2023-12-06 ENCOUNTER — TELEPHONE (OUTPATIENT)
Dept: ENDOCRINOLOGY | Facility: CLINIC | Age: 73
End: 2023-12-06

## 2023-12-06 ENCOUNTER — OFFICE VISIT (OUTPATIENT)
Dept: ENDOCRINOLOGY | Facility: CLINIC | Age: 73
End: 2023-12-06
Payer: COMMERCIAL

## 2023-12-06 VITALS
RESPIRATION RATE: 18 BRPM | HEART RATE: 73 BPM | BODY MASS INDEX: 27.34 KG/M2 | TEMPERATURE: 98.4 F | HEIGHT: 74 IN | WEIGHT: 213 LBS | SYSTOLIC BLOOD PRESSURE: 122 MMHG | OXYGEN SATURATION: 94 % | DIASTOLIC BLOOD PRESSURE: 72 MMHG

## 2023-12-06 DIAGNOSIS — Z79.4 LONG-TERM INSULIN USE (CMS/HCC): ICD-10-CM

## 2023-12-06 DIAGNOSIS — E65 LIPOHYPERTROPHY: ICD-10-CM

## 2023-12-06 DIAGNOSIS — R01.1 HEART MURMUR: ICD-10-CM

## 2023-12-06 DIAGNOSIS — E13.9 LADA (LATENT AUTOIMMUNE DIABETES IN ADULTS), MANAGED AS TYPE 1 (CMS/HCC): Primary | ICD-10-CM

## 2023-12-06 DIAGNOSIS — I10 HYPERTENSION, UNSPECIFIED TYPE: ICD-10-CM

## 2023-12-06 DIAGNOSIS — E04.2 MULTIPLE THYROID NODULES: ICD-10-CM

## 2023-12-06 DIAGNOSIS — E78.5 HYPERLIPIDEMIA, UNSPECIFIED HYPERLIPIDEMIA TYPE: ICD-10-CM

## 2023-12-06 DIAGNOSIS — E16.2 HYPOGLYCEMIA: ICD-10-CM

## 2023-12-06 PROCEDURE — 3074F SYST BP LT 130 MM HG: CPT | Performed by: INTERNAL MEDICINE

## 2023-12-06 PROCEDURE — 99214 OFFICE O/P EST MOD 30 MIN: CPT | Performed by: INTERNAL MEDICINE

## 2023-12-06 PROCEDURE — 95251 CONT GLUC MNTR ANALYSIS I&R: CPT | Performed by: INTERNAL MEDICINE

## 2023-12-06 PROCEDURE — 3078F DIAST BP <80 MM HG: CPT | Performed by: INTERNAL MEDICINE

## 2023-12-06 PROCEDURE — 3008F BODY MASS INDEX DOCD: CPT | Performed by: INTERNAL MEDICINE

## 2023-12-06 RX ORDER — CHOLECALCIFEROL (VITAMIN D3) 25 MCG
1000 TABLET ORAL DAILY
COMMUNITY

## 2023-12-06 NOTE — TELEPHONE ENCOUNTER
Patient called to speak with Dr Trinh. He states that he would like to cancel his script for an Epen and would prefer a glucose pump in replacement. Please give the patient a callback to advise.

## 2023-12-06 NOTE — PROGRESS NOTES
REASON FOR VISIT:   Akin Daily is a 73 y.o. male who presents today for evaluation and management of his diabetes.   A consultation was requested by Ana Delgado MD.     Previous patient at [facility]. Interim followed Dr. Gregorio Coburn NP    12/06/23   Patient was accompanied by his wife who provided additional history.  No issues interim  Plan for TKR 1/6/24  Was told aortic murmer on exam  BF and lunch are closer within 2 hrs, low post lunch and overcorrects  Lunch variable peanut butter crackers/sandwich. Ice cream+, waffles for breakfast      BF better, blood glucoses better, needing less insulin  Breakfast added yogurt daily  Had 3 steroid inj, last was a month ago, High sugars lasting a week into 400's  Sun dinner - stromboli, chicken steak BG inc to >400,   He has large portion sizes, sweet tooth  He eats bedtime snack if sugars under 150, bedtime snacks  Cereal  Some of the hyperglycemia post dinner is due to not taking NovoLog Premeal if blood glucose under 100    DIABETES HISTORY:  Type of Diabetes: Initially type 2 in 2005 and as YANCI by Dr. Esquivel in 2014 (by blood test per wife). Since then on insulin  Prior DKA: no    Current diabetes treatment:   Basaglar 16 units AM  Novolog - BF ~ 8-9 units, lunch 8-10 units, dinner 7-10 units, 15 units for rice  Correction scale sugar >150 1 unit for every 50 mg - does not use often    Past regimen:  Metformin, Jardiance  Stopped Metformin with belching    Continuous glucose monitor:   CGM downloaded and values reviewed : Yes  Current regime: above  No. of days of tracings available 14  Average blood glucose 186  Stand dev 35  % above target 44  % at target 56  % below target 0  Continuous Glucose Monitoring (CGM) Interpretation  I reviewed 72 hours + of CGM data. There is a trend for blood sugar elevated pos prandial, low post lunch from taking BF and Lunch closer, hyper post dinner,    Recommendations: below      COMPLICATIONS OF DIABETES:  Eye disease in  the past: no DR   Last eye exam: 4/20 - reinforced    Nephropathy:  no    Peripheral neuropathy: no   History of foot sores/infections/amputations: no   Podiatrist     Macrovascular disease: Hyperlipidemia, (stopped statin for muscle aches)      DIET/EXERCISE HISTORY:  Diet: 2-3 meals/day, lunch is very light peanut butter with cracker.  No protein for BF, BF is mainly carb cereal, rice inc BG to 300's  Snack: 2 small Ice cream bars daily, Peanut at night, or cracker if BS <180  Exercise: no      GLYCEMIC CONTROL:  Glucometer: One Touch  Frequency of glucose monitoring: Follows Dexcom G6  Review of home blood glucose readings:  Before breakfast:    After breakfast:   Before lunch:   After lunch:   Before dinner:   After dinner:   Bedtime:   2 am:   Hypoglycemic awareness: yes  Frequency of lows:     2 children physicians, foot and jamie specialist and dentist      Past Medical History:   Diagnosis Date   • Benign prostatic hyperplasia 09/10/2021   • Diabetes mellitus type I (CMS/HCC)    • Gout, unspecified 09/10/2021   • Heart murmur    • Obstructive sleep apnea (adult) (pediatric) 09/10/2021   • Other hyperlipidemia 02/11/2019   • Thyroid nodule 12/10/2019     History reviewed. No pertinent surgical history.  Family History   Problem Relation Age of Onset   • Diabetes Biological Mother    • Heart attack Biological Mother    • Lung cancer Biological Father    • Colon cancer Biological Brother    • Diabetes Biological Brother    • Diabetes Maternal Grandmother      Current Outpatient Medications   Medication Sig Dispense Refill   • BASAGLAR KWIKPEN U-100 INSULIN 100 unit/mL (3 mL) pen Inject 18 units under the skin daily (Patient taking differently: 17 Units. Inject 17 units under the skin daily) 18 mL 3   • blood-glucose sensor (DEXCOM G7 SENSOR) device device To monitor sugars 9 each 3   • cholecalciferol, vitamin D3, (cholecalciferol) 1,000 unit (25 mcg) tablet Take 1,000 Units by mouth daily.     • DEXCOM G7  " misc  TO CHECK SUGARS 1 each 0   • FLUoxetine (PROzac) 20 mg capsule Take 40 mg by mouth daily.     • insulin aspart U-100 (NovoLOG FlexPen U-100 Insulin) 100 unit/mL (3 mL) pen To take NovoLog with each meal as instructed including correction, maximum daily total 60 units 15 mL 3   • meloxicam (MOBIC) 15 mg tablet Take 15 mg by mouth daily.     • mupirocin (BACTROBAN) 2 % ointment See admin instr.     • pen needle, diabetic (BD MILLIE 2ND GEN PEN NEEDLE) 32 gauge x 5/32\" needle To inject upto 4 times a day 400 each 3   • traZODone 50 mg tablet Take 50 mg by mouth.     • cyclobenzaprine (FLEXERIL) 10 mg tablet Take 10 mg by mouth.     • cyclobenzaprine (FLEXERIL) 10 mg tablet TAKE 1 TABLET BY MOUTH 2 TIMES A DAY AS NEEDED FOR MUSCLE SPASMS FOR UP TO 10 DAYS.     • metaxalone (SKELAXIN) 800 mg tablet Take 800 mg by mouth 3 times daily.     • methylPREDNISolone (MEDROL DOSEPACK) 4 mg tablet See admin instr.     • methylPREDNISolone (MEDROL DOSEPACK) 4 mg tablet TAKE 6 TABLETS ON DAY 1 AS DIRECTED ON PACKAGE AND DECREASE BY 1 TAB EACH DAY FOR A TOTAL OF 6 DAYS       No current facility-administered medications for this visit.     Allergies   Allergen Reactions   • Poison Ivy Extract Rash   • Hay Fever And Allergy Relief Other (see comments)   • Statins-Hmg-Coa Reductase Inhibitors      Other reaction(s): leg pain       ROS:  The complete review of systems is otherwise negative except as noted in HPI.    PHYSICAL EXAM:  Vitals:    12/06/23 0939   BP: 122/72   BP Location: Right upper arm   Patient Position: Sitting   Pulse: 73   Resp: 18   Temp: 36.9 °C (98.4 °F)   TempSrc: Temporal   SpO2: 94%   Weight: 96.6 kg (213 lb)   Height: 1.88 m (6' 2\")       Body mass index is 27.35 kg/m².    Wt Readings from Last 3 Encounters:   12/06/23 96.6 kg (213 lb)   08/09/23 96.2 kg (212 lb)   04/04/23 95.7 kg (211 lb)        GENERAL: Well developed, well nourished, in no acute distress  EYES: conjunctiva pink and moist, " no proptosis  NECK/LYMPHATIC: Supple, nl cervical lymphadenopathy, acanthosis no   THYROID: thyroid palpable, no distinct nodules palpated, non tender on my exam  CARDIOVASCULAR: Regular rate and Regular rhythm  RESPIRATORY: Symmetrical chest expansion, normal respiratory effort, clear to auscultation bilaterally  GASTROINTESTINAL: Soft, non tender, inj site lipodystrophy  MUSCULOSKELETAL: no cyanosis, normal muscle mass, no edema in lower extremities  SKIN: Warm, dry, no lesions   NEUROLOGIC: Awake, alert, and communicating appropriately       OUTSIDE RECORDS: reviewed. Pertinent positives summarized in HPI    LABS:   12/22 A1c 8.4, free T4 0.9, TSH 1.48, creatinine urine random 155  9/22 -glucose 135, C-peptide 0.48, A1c 8.1    6/22 -A1c 8.4,   3/22 -A1c 8.5, MANSOOR <5, IA-2 and Zn8 neg    12/21 -calcium 9.2, creatinine 1.16, LFTs normal, GFR over 60, urine microalbumin 10, , HDL 51, triglycerides 124, A1c 8.2    Hemoglobin A1C   Date Value Ref Range Status   12/04/2023 8.0 (H) 4.0 - 5.6 % Final   12/04/2023 7.6 (H) <5.7 % Final     Comment:     In the absence of an established diagnosis of Diabetes Mellitus, HbA1c levels between 5.7% and 6.4% are indicative of increased risk for developing Diabetes(Pre-Diabetes). Levels of 6.5% or greater are diagnostic for Diabetes Mellitus.   07/06/2023 8.2 (H) <5.7 % Final     Comment:     In the absence of an established diagnosis of Diabetes Mellitus, HbA1c levels between 5.7% and 6.4% are indicative of increased risk for developing Diabetes(Pre-Diabetes). Levels of 6.5% or greater are diagnostic for Diabetes Mellitus.       Imaging:   Results for orders placed during the hospital encounter of 04/17/23    ULTRASOUND THYROID [[id number]]    Narrative  CLINICAL HISTORY:Type 2 diabetes mellitus with hyperglycemia. History of thyroid  nodules.    COMMENT:  Ultrasound examination of the thyroid gland is performed with grayscale and  color Doppler imaging.    COMPARISON: Prior  images are currently unavailable for comparison.    RIGHT: The right thyroid lobe measures 5.1 x 2.1 x 2.0 cm. The right thyroid  lobe is homogeneous in echotexture with no discrete nodules identified.    ISTHMUS: The isthmus measures 3.5 mm in maximal AP dimension.  Isthmic  parenchyma is homogeneous with no nodules identified.    LEFT:  The left thyroid lobe measures 4.4 x 2.2 x 2.1 cm. The left thyroid lobe  is heterogeneous in echotexture with underlying nodules identified.  Nodule # 1  Location: Left upper pole  Size: 0.8 x 0.7 x 0.8 cm  Composition: Mixed cystic and solid  (1 point)  Echogenicity: isoechoic (1 point)  Shape: Wider-than-tall (0 points)  Margin: Ill-defined (0 points)  Echogenic foci: None or large comet-tail artifacts (0 points)  Halo: None  Vascularity: Present peripherally  Comparison: None available  TI-RADS category: TR3 (3 points): Mildly suspicious.    Nodule # 2  Location: Left midpole  Size: 0.7 x 0.3 x 0.1 cm  Composition: Mixed cystic and solid  (1 point)  Echogenicity: Solid and coarsely calcified  Shape: Wider-than-tall (0 points)  Margin: Ill-defined (0 points)  Echogenic foci: Macrocalcifications (1 point)  Halo: None  Vascularity: Present  Comparison: None available  TI-RADS category: TR2 (2 points): Not suspicious.    There is no evidence for hypervascularity of the gland. No cervical  lymphadenopathy.    --    Impression  Left thyroid nodules as described above. Follow-up should be based according to  TI RADS criteria.    ----------------------------------------------------------------------------  TI-RADS recommendations from ACR White paper 2017  Composition:  Cystic or almost completely cystic = 0 points  Spongiform = 0 points  Mixed cystic and solid = 1 point  Solid or almost completely solid = 2 points  -  Echogenicity:  Anechoic = 0 points  Hyperechoic or isoechoic = 1 point  Hypoechoic = 2 points  Very hypoechoic = 3 points  -  Shape:  Wider than tall = 0 point  Taller  than wide = 3 points  -  Margin:  Smooth = 0 point  Ill-defined = 0 point  Lobulated/irregular = 2 points  Extrathyroidal extension = 3 points  -    ASSESSMENT and PLAN    Akin Daily is a 73 y.o. male with YANCI complicated by hyperlipidemia    1. Diabetes Mellitus: YANCI with hyperglycemia and hypoglycemia/long-term insulin use    Lab Results   Component Value Date    HGBA1C 8.0 (H) 12/04/2023   Low C-peptide. MANSOOR, IA2, ZnT8 antibodies were negative, continue insulin    Recommendations based on review of CGM  He is taking breakfast insulin and lunch insulin close to each other and resulting hypoglycemia.  We discussed to space at least 3 hours or to take less insulin for lunch.  Reviewed carb counting, to take less/no insulin if he is doing only peanut butter and crackers for lunch   He will benefit from inpen which would provide insulin on board so he could adjust the dose    not interested in insulin pump  Stressed diet adherence, add protein with each meal, lower ice cream, simple carbs, honey etc.  Reemphasized goal A1c 7.5, especially in the context of surgery  Discussed avoiding overcorrection of hypoglycemia will result in improvement of post correction hyperglycemia  Continue Basaglar to 16 units daily  JEREMÍAS  Continue Dexcom G7  To maintain the log and bring to the visit to review  Hypoglycemia 15-15      Advised pt to contact the office for further medication adjustment if blood glucose out of target range for 3-5 days below 70 or above 250  We discussed changing to Dexcom G7    Preop surgery recommendations -hold NovoLog when n.p.o.  Lower Basaglar to 12 units when n.p.o.  Resume home regimen when eating normally  If hypoglycemia to use 15-15 correction  If hyperglycemia to use JEREMÍAS    2.  Hypertension:   Urine micro albumin test normal, check in a year 3/24    3.  Hyperlipidemia: LDL not at goal  Statin, Zetia could not tolerate muscle leg cramps  Follows PCP, advised to forward results    4. Thyroid  nodules  Last ultrasound 2019 stable left thyroid gland nodule and colloidal cyst  2 subcentimeter thyroid nodules, based on TIRADS do not need follow-up  Repeat thyroid ultrasound if changes clinically      5.  Lipohypertrophy  Rotating sites, reviewed alternative injection and CGM sites    6.  Heart murmur  Advised to follow with PCP, consider echo    Several elements of diabetes self management were reviewed including, but not limited to the importance of blood sugar monitoring, symptoms and treatment of hypoglycemia and hyperglycemia/A1c goals, compliance with medications, diet, exercise, lifestyle issues, surveillance of eyes and feet and need for routine follow-up with ophthalmology and podiatry.    Pt is aware of alternatives of therapy, risks and benefits and accepts risk of default.      I have answered all of my patient's questions to the best of my ability. To call us with any changes      Orders Placed This Encounter   Procedures   • Hemoglobin A1c   • Microalbumin/Creatinine Ur Random   • Ambulatory referral to Diabetic Education   • Ambulatory referral to Nutrition Services       Return to office in 3 months or earlier if issues arise     This patient has been dictated using speech recognition software. Inadvertent speech recognition errors should be disregarded. Please do not hesitate to call the office for clarification.

## 2023-12-20 ENCOUNTER — OFFICE VISIT (OUTPATIENT)
Dept: ENDOCRINOLOGY | Facility: CLINIC | Age: 73
End: 2023-12-20
Payer: COMMERCIAL

## 2023-12-20 DIAGNOSIS — E13.9 LADA (LATENT AUTOIMMUNE DIABETES IN ADULTS), MANAGED AS TYPE 1 (CMS/HCC): ICD-10-CM

## 2023-12-20 DIAGNOSIS — E78.5 HYPERLIPIDEMIA, UNSPECIFIED HYPERLIPIDEMIA TYPE: ICD-10-CM

## 2023-12-20 PROCEDURE — 97802 MEDICAL NUTRITION INDIV IN: CPT

## 2023-12-20 PROCEDURE — 200200 PR NO CHARGE

## 2023-12-20 NOTE — Clinical Note
Reviewed pump options with compatible CGMs and he would like to stay on G7 so is choosing the [employer]andem Tslim.  The rep will contact him.

## 2023-12-20 NOTE — Clinical Note
Reviewed patch vs tubed pump and system requirements.  Wants to stay on G7 so is opting for Tandem Tslim, CIQ.  Reviewed pumping basics pros and cons.

## 2023-12-21 NOTE — PROGRESS NOTES
"DETAILS OF VISIT   Reason for referral: diabetes education/nutrition         VISIT DESCRIPTION         Akin Daily is a 73 y.o. male here for an initial nutrition evaluation.    Current Wt:   Wt Readings from Last 1 Encounters:   12/06/23 96.6 kg (213 lb)     Ht:   Ht Readings from Last 1 Encounters:   12/06/23 1.88 m (6' 2\")          BMI Readings from Last 1 Encounters:   12/06/23 27.35 kg/m²       BMI Classification: 25- 29.9 Overweight      Last A1c:  Lab Results   Component Value Date    HGBA1C 8.0 (H) 12/04/2023       I met with Akin Daily today for Nutrition Counseling [Z71.3] and dietary education related to the following:    E10.9 - Type 1 diabetes mellitus without complications      Social hx:    Occupation: retired    Shift: n/a    Lives with: wife    Diet hx:    Current diet: unrestricted    Diet Education hx: has not had diabetes RD appointment yet    Dining out: Weekly    Recall:   Breakfast added yogurt daily  Sun dinner - stromboli, chicken steak BG inc to >400,   He has large portion sizes, sweet tooth  He eats bedtime snack if sugars under 150, bedtime snacks  Cereal      Sugar Sweetened Beverages: juices    Pt educated on:   Carb sources  , Carb estimation   and Carb counting        Hypoglycemia: reviewed ICE clues:  Insulin/Carbs/Exercise and reasons for low blood glucose.  Reviewed rule of 15 gm fast acting carbs for hypo and repeat BG in 15 minutes.        Medications:   Medications for Diabetes: basal bolus    Medication affordability: no issues    Insulin injection doses and timing:        Long: Basaglar 17      Short: Novolog    Priming Pens? yes    Insulin injection/ infusion set location(s) and rotation: stomach.  Reviewed need for rotating sites, include top of thighs, back of arms    Technology:    Diabetes Technology used: Dexcom G7      Pump Questions  Reviewed pump technology and the different systems, patch versus tubed pumps, compatibility with CGM, need for a smart phone    Blood Sugar " "information:            Other:  Physical Activity: active    Emotional Wellness: coping well    Handouts Provided: none    Pt Goals:  Start technology.  Patient would like to stay on G7 and does not have a smart phone to connect G7 to, prefers the Tandem Tslim system.      Time Spent with Patient for face to face office visit: 1 hour    MEDICATIONS AND ALLERGIES     Current Outpatient Medications on File Prior to Visit   Medication Sig Dispense Refill   • BASAGLAR KWIKPEN U-100 INSULIN 100 unit/mL (3 mL) pen Inject 18 units under the skin daily (Patient taking differently: 17 Units. Inject 17 units under the skin daily) 18 mL 3   • blood-glucose sensor (DEXCOM G7 SENSOR) device device To monitor sugars 9 each 3   • cholecalciferol, vitamin D3, (cholecalciferol) 1,000 unit (25 mcg) tablet Take 1,000 Units by mouth daily.     • cyclobenzaprine (FLEXERIL) 10 mg tablet Take 10 mg by mouth.     • cyclobenzaprine (FLEXERIL) 10 mg tablet TAKE 1 TABLET BY MOUTH 2 TIMES A DAY AS NEEDED FOR MUSCLE SPASMS FOR UP TO 10 DAYS.     • DEXCOM G7  misc  TO CHECK SUGARS 1 each 0   • FLUoxetine (PROzac) 20 mg capsule Take 40 mg by mouth daily.     • insulin aspart U-100 (NovoLOG FlexPen U-100 Insulin) 100 unit/mL (3 mL) pen To take NovoLog with each meal as instructed including correction, maximum daily total 60 units 15 mL 3   • meloxicam (MOBIC) 15 mg tablet Take 15 mg by mouth daily.     • metaxalone (SKELAXIN) 800 mg tablet Take 800 mg by mouth 3 times daily.     • methylPREDNISolone (MEDROL DOSEPACK) 4 mg tablet See admin instr.     • methylPREDNISolone (MEDROL DOSEPACK) 4 mg tablet TAKE 6 TABLETS ON DAY 1 AS DIRECTED ON PACKAGE AND DECREASE BY 1 TAB EACH DAY FOR A TOTAL OF 6 DAYS     • mupirocin (BACTROBAN) 2 % ointment See admin instr.     • pen needle, diabetic (BD MILLIE 2ND GEN PEN NEEDLE) 32 gauge x 5/32\" needle To inject upto 4 times a day 400 each 3   • traZODone 50 mg tablet Take 50 mg by mouth.       No " current facility-administered medications on file prior to visit.         Poison ivy extract, Hay fever and allergy relief, and Statins-hmg-coa reductase inhibitors          RECOMMENDATIONS   - [facility] will connect [employer] rep with family  - family will reach out to [facility] when they receive their pump to set up trainings.    Follow up: once pump comes, after knee surgery this January    Coty Schafer RD, [facility]  12/21/2023

## 2023-12-27 ENCOUNTER — TELEPHONE (OUTPATIENT)
Dept: ENDOCRINOLOGY | Facility: CLINIC | Age: 73
End: 2023-12-27
Payer: COMMERCIAL

## 2023-12-27 NOTE — TELEPHONE ENCOUNTER
[facility] called to touch base and schedule another appointment to review carb counting in more detail.    Encouraged patient to call for appointment or reach out through [employer].    Coty Schafer RD, [facility]

## 2024-02-07 ENCOUNTER — OFFICE VISIT (OUTPATIENT)
Dept: ENDOCRINOLOGY | Facility: CLINIC | Age: 74
End: 2024-02-07
Payer: COMMERCIAL

## 2024-02-07 DIAGNOSIS — E13.9 LADA (LATENT AUTOIMMUNE DIABETES IN ADULTS), MANAGED AS TYPE 1 (CMS/HCC): ICD-10-CM

## 2024-02-07 PROCEDURE — 200200 PR NO CHARGE

## 2024-02-07 PROCEDURE — 97803 MED NUTRITION INDIV SUBSEQ: CPT

## 2024-02-07 NOTE — Clinical Note
Akin's numbers much better than earlier this month. Doing a better job of taking meal time insulin, still not really carb counting and now no longer interested in pumping.  If he is close to 100 at bedtime (under 150) he is eating handfuls of cheerios.  Reviewed that these are great numbers and not to eat without insulin.  Suggested a basal test to see if numbers are dropping over night. Reviewed carb goals and reviewed carb counting.

## 2024-02-08 NOTE — PROGRESS NOTES
"DIABETES/NUTRITION FOLLOW UP PROGRESS NOTE    Date: February 7, 2024         There were no vitals filed for this visit.      There is no height or weight on file to calculate BMI.      BMI Readings from Last 1 Encounters:   12/06/23 27.35 kg/m²         Wt Readings from Last 3 Encounters:   12/06/23 96.6 kg (213 lb)   08/09/23 96.2 kg (212 lb)   04/04/23 95.7 kg (211 lb)        No results found for: \"GLUCOSE\", \"CALCIUM\", \"NA\", \"K\", \"CO2\", \"CL\", \"BUN\", \"CREATININE\"    Lab Results   Component Value Date    HGBA1C 8.0 (H) 12/04/2023       Hypoglycemia:  None  Reviewed the rule of 15        Diabetes Technology Used:  Dexcom G 7      BG records/ CGM Report:       Comments/Updates:  Improved from earlier this month:    Topics Reviewed:   Disease process    Type 1 diabetes pathophysiology and treatment   Nutrition    Carbohydrate counting basics    Label reading review    Using sliding scale/ Insulin to carb ratios    Hidden carbs    Free foods    Timing of carbs before glucose testing    Snacking/grazing    Portion size estimates   Exercise    Benefits of exercise    Effects of exercise on blood glucose (hypoglycemia and hyperglycemia)    Testing before during and after exercise    Treating lows before and during exercise   Monitoring    When/how to test/tips for accuracy    Target ranges   Acute Complications   Hypoglycemia    What is a low blood sugar    What are the causes of low blood sugar    Expected frequency of lows    Warning signs    Treating lows:  Carbs, glucagon, glucose gel.  Rule of 15    Safety:  carrying carbs/medic alert   Hyperglycemia    What is a high blood sugar    What causes high blood sugars    Treating high blood sugar with insulin/exercise/fluids   Ketones    How and when to check / tips for accuracy    Managing illness    Managing ketone basics/ when to call for help   Chronic Complications    Consequences of poor control    Strategies for preventing/minimizing risks   Psychosocial " issues    Managing anxiety, stress    Integrating diabetes into day to day life          Recap of Previous Goals/ Changes to insulin:   - (from initial note) no longer interested in a pump  -        Recommendations:  Consider a basal test, don't treat in range numbers heading to bed    Follow up: few months      Time Spent: 30 minutes  Signature: Coty Kim. AMY Schafer, [facility], RD

## 2024-02-19 ENCOUNTER — APPOINTMENT (OUTPATIENT)
Dept: LAB | Facility: CLINIC | Age: 74
End: 2024-02-19
Attending: INTERNAL MEDICINE
Payer: COMMERCIAL

## 2024-02-19 ENCOUNTER — TRANSCRIBE ORDERS (OUTPATIENT)
Dept: REGISTRATION | Facility: CLINIC | Age: 74
End: 2024-02-19

## 2024-02-19 DIAGNOSIS — E78.5 HYPERLIPIDEMIA, UNSPECIFIED: ICD-10-CM

## 2024-02-19 DIAGNOSIS — E13.9 LADA (LATENT AUTOIMMUNE DIABETES IN ADULTS), MANAGED AS TYPE 1 (CMS/HCC): ICD-10-CM

## 2024-02-19 DIAGNOSIS — Z79.4 LONG TERM (CURRENT) USE OF INSULIN (CMS/HCC): ICD-10-CM

## 2024-02-19 DIAGNOSIS — E78.5 HYPERLIPIDEMIA, UNSPECIFIED HYPERLIPIDEMIA TYPE: ICD-10-CM

## 2024-02-19 DIAGNOSIS — E11.65 TYPE 2 DIABETES MELLITUS WITH HYPERGLYCEMIA (CMS/HCC): Primary | ICD-10-CM

## 2024-02-19 DIAGNOSIS — E11.65 TYPE 2 DIABETES MELLITUS WITH HYPERGLYCEMIA (CMS/HCC): ICD-10-CM

## 2024-02-19 PROBLEM — E66.3 OVERWEIGHT WITH BODY MASS INDEX (BMI) 25.0-29.9: Status: ACTIVE | Noted: 2024-01-25

## 2024-02-19 PROBLEM — M17.12 OSTEOARTHRITIS OF LEFT KNEE: Status: ACTIVE | Noted: 2024-01-08

## 2024-02-19 LAB
ALBUMIN SERPL-MCNC: 3.7 G/DL (ref 3.5–5.7)
ALP SERPL-CCNC: 89 IU/L (ref 34–125)
ALT SERPL-CCNC: 23 IU/L (ref 7–52)
ANION GAP SERPL CALC-SCNC: 6 MEQ/L (ref 3–15)
AST SERPL-CCNC: 24 IU/L (ref 13–39)
BILIRUB SERPL-MCNC: 0.7 MG/DL (ref 0.3–1.2)
BUN SERPL-MCNC: 18 MG/DL (ref 7–25)
CALCIUM SERPL-MCNC: 9.2 MG/DL (ref 8.6–10.3)
CHLORIDE SERPL-SCNC: 104 MEQ/L (ref 98–107)
CHOLEST SERPL-MCNC: 201 MG/DL
CO2 SERPL-SCNC: 29 MEQ/L (ref 21–31)
CREAT SERPL-MCNC: 1 MG/DL (ref 0.7–1.3)
CREAT UR-MCNC: 107.1 MG/DL
EGFRCR SERPLBLD CKD-EPI 2021: >60 ML/MIN/1.73M*2
EST. AVERAGE GLUCOSE BLD GHB EST-MCNC: 174 MG/DL
GLUCOSE SERPL-MCNC: 106 MG/DL (ref 70–99)
HBA1C MFR BLD: 7.7 %
HDLC SERPL-MCNC: 54 MG/DL
HDLC SERPL: 3.7 {RATIO}
LDLC SERPL CALC-MCNC: 124 MG/DL
MICROALBUMIN UR-MCNC: <2 MG/L
MICROALBUMIN/CREAT UR: NORMAL MG/G{CREAT}
NONHDLC SERPL-MCNC: 147 MG/DL
POTASSIUM SERPL-SCNC: 4.4 MEQ/L (ref 3.5–5.1)
PROT SERPL-MCNC: 6.5 G/DL (ref 6–8.2)
SODIUM SERPL-SCNC: 139 MEQ/L (ref 136–145)
TRIGL SERPL-MCNC: 113 MG/DL

## 2024-02-19 PROCEDURE — 36415 COLL VENOUS BLD VENIPUNCTURE: CPT

## 2024-02-19 PROCEDURE — 82043 UR ALBUMIN QUANTITATIVE: CPT

## 2024-02-19 PROCEDURE — 80053 COMPREHEN METABOLIC PANEL: CPT

## 2024-02-19 PROCEDURE — 80061 LIPID PANEL: CPT

## 2024-02-19 PROCEDURE — 83036 HEMOGLOBIN GLYCOSYLATED A1C: CPT

## 2024-02-19 RX ORDER — FLUOXETINE HYDROCHLORIDE 40 MG/1
40 CAPSULE ORAL DAILY
COMMUNITY
Start: 2023-12-25

## 2024-02-19 RX ORDER — ASPIRIN 81 MG/1
81 TABLET ORAL 2 TIMES DAILY
COMMUNITY
Start: 2024-01-08 | End: 2024-08-29

## 2024-02-19 RX ORDER — OXYCODONE HYDROCHLORIDE 5 MG/1
TABLET ORAL
COMMUNITY
Start: 2024-01-09 | End: 2024-08-29

## 2024-02-19 RX ORDER — SENNOSIDES 8.6 MG/1
8.6 TABLET ORAL
COMMUNITY
Start: 2024-01-08 | End: 2024-08-29

## 2024-02-19 RX ORDER — POLYETHYLENE GLYCOL 3350 17 G/17G
17 POWDER, FOR SOLUTION ORAL DAILY
COMMUNITY
Start: 2024-01-08 | End: 2024-08-29

## 2024-02-19 RX ORDER — DOCUSATE SODIUM 100 MG/1
100 CAPSULE, LIQUID FILLED ORAL
COMMUNITY
Start: 2024-01-08 | End: 2024-08-29

## 2024-02-19 RX ORDER — OXYCODONE HYDROCHLORIDE 5 MG/1
5 TABLET ORAL
COMMUNITY
Start: 2024-01-09 | End: 2024-08-29

## 2024-02-20 ENCOUNTER — OFFICE VISIT (OUTPATIENT)
Dept: ENDOCRINOLOGY | Facility: CLINIC | Age: 74
End: 2024-02-20
Payer: COMMERCIAL

## 2024-02-20 VITALS
HEIGHT: 74 IN | SYSTOLIC BLOOD PRESSURE: 120 MMHG | WEIGHT: 212.8 LBS | OXYGEN SATURATION: 99 % | BODY MASS INDEX: 27.31 KG/M2 | RESPIRATION RATE: 18 BRPM | HEART RATE: 78 BPM | TEMPERATURE: 98 F | DIASTOLIC BLOOD PRESSURE: 80 MMHG

## 2024-02-20 DIAGNOSIS — E04.2 MULTIPLE THYROID NODULES: ICD-10-CM

## 2024-02-20 DIAGNOSIS — E65 LIPOHYPERTROPHY: ICD-10-CM

## 2024-02-20 DIAGNOSIS — E78.5 HYPERLIPIDEMIA, UNSPECIFIED HYPERLIPIDEMIA TYPE: ICD-10-CM

## 2024-02-20 DIAGNOSIS — E11.65 UNCONTROLLED TYPE 2 DIABETES MELLITUS WITH HYPERGLYCEMIA (CMS/HCC): ICD-10-CM

## 2024-02-20 DIAGNOSIS — Z79.4 LONG-TERM INSULIN USE (CMS/HCC): ICD-10-CM

## 2024-02-20 DIAGNOSIS — I10 HYPERTENSION, UNSPECIFIED TYPE: ICD-10-CM

## 2024-02-20 DIAGNOSIS — E13.9 LADA (LATENT AUTOIMMUNE DIABETES IN ADULTS), MANAGED AS TYPE 1 (CMS/HCC): Primary | ICD-10-CM

## 2024-02-20 PROCEDURE — 3074F SYST BP LT 130 MM HG: CPT | Performed by: INTERNAL MEDICINE

## 2024-02-20 PROCEDURE — 95251 CONT GLUC MNTR ANALYSIS I&R: CPT | Performed by: INTERNAL MEDICINE

## 2024-02-20 PROCEDURE — 3079F DIAST BP 80-89 MM HG: CPT | Performed by: INTERNAL MEDICINE

## 2024-02-20 PROCEDURE — 3008F BODY MASS INDEX DOCD: CPT | Performed by: INTERNAL MEDICINE

## 2024-02-20 PROCEDURE — 99214 OFFICE O/P EST MOD 30 MIN: CPT | Performed by: INTERNAL MEDICINE

## 2024-02-20 RX ORDER — PEN NEEDLE, DIABETIC 30 GX3/16"
NEEDLE, DISPOSABLE MISCELLANEOUS
Qty: 400 EACH | Refills: 3 | Status: SHIPPED | OUTPATIENT
Start: 2024-02-20 | End: 2025-01-07 | Stop reason: SDUPTHER

## 2024-02-20 RX ORDER — INSULIN ASPART 100 [IU]/ML
INJECTION, SOLUTION INTRAVENOUS; SUBCUTANEOUS
Qty: 30 ML | Refills: 3 | Status: SHIPPED | OUTPATIENT
Start: 2024-02-20 | End: 2025-01-07 | Stop reason: SDUPTHER

## 2024-02-20 RX ORDER — INSULIN GLARGINE 100 [IU]/ML
16 INJECTION, SOLUTION SUBCUTANEOUS NIGHTLY
Qty: 30 ML | Refills: 3 | Status: SHIPPED | OUTPATIENT
Start: 2024-02-20 | End: 2025-01-07 | Stop reason: SDUPTHER

## 2024-02-20 NOTE — PROGRESS NOTES
REASON FOR VISIT:   Akin Daily is a 73 y.o. male who presents today for evaluation and management of his diabetes.   A consultation was requested by Ana Delgado MD.     Previous patient at [facility]. Interim followed Dr. Gregorio Coburn NP    02/20/24   Patient was accompanied by his wife who provided additional history.  Saw Coty twice and fins helpful  No issues interim  TKR no complications, torn meniscus and arthtisits  Using correction scale  Now feels better going to bedtime ~ 150  Hypoglycemia when n.p.o. for labs    Plan for TKR 1/6/24  Was told aortic murmer on exam  BF and lunch are closer within 2 hrs, low post lunch and overcorrects  Lunch variable peanut butter crackers/sandwich. Ice cream+, waffles for breakfast      BF better, blood glucoses better, needing less insulin  Breakfast added yogurt daily  Had 3 steroid inj, last was a month ago, High sugars lasting a week into 400's  Sun dinner - stromboli, chicken steak BG inc to >400,   He has large portion sizes, sweet tooth  He eats bedtime snack if sugars under 150, bedtime snacks  Cereal  Some of the hyperglycemia post dinner is due to not taking NovoLog Premeal if blood glucose under 100    DIABETES HISTORY:  Type of Diabetes: Initially type 2 in 2005 and as YANCI by Dr. Esquivel in 2014 (by blood test per wife). Since then on insulin  Prior DKA: no    Current diabetes treatment:   Basaglar 17 units PM  Novolog - BF ~ 8-9 units, lunch 8-10 units, dinner 8-10 units, 15 units for rice  Correction scale sugar >150 1 unit for every 50 mg     Past regimen:  Metformin, Jardiance  Stopped Metformin with belching    Continuous glucose monitor: Download below        COMPLICATIONS OF DIABETES:  Eye disease in the past: no DR   Last eye exam: 4/20 - reinforced    Nephropathy:  no    Peripheral neuropathy: no   History of foot sores/infections/amputations: no   Podiatrist     Macrovascular disease: Hyperlipidemia, (stopped statin for muscle  "aches)      DIET/EXERCISE HISTORY:  Diet: 2-3 meals/day, lunch is very light peanut butter with cracker.  No protein for BF, BF is mainly carb cereal, rice inc BG to 300's  Snack: 2 small Ice cream bars daily, Peanut at night, or cracker if BS <180  Exercise: no      GLYCEMIC CONTROL:  Glucometer: One Touch  Frequency of glucose monitoring: Follows Dexcom G6  Review of home blood glucose readings:  Before breakfast:    After breakfast:   Before lunch:   After lunch:   Before dinner:   After dinner:   Bedtime:   2 am:   Hypoglycemic awareness: yes  Frequency of lows:     2 children physicians, foot and jamie specialist and dentist          Past Medical History:   Diagnosis Date   • Benign prostatic hyperplasia 09/10/2021   • Diabetes mellitus type I (CMS/HCC)    • Gout, unspecified 09/10/2021   • Heart murmur    • Obstructive sleep apnea (adult) (pediatric) 09/10/2021   • Other hyperlipidemia 02/11/2019   • Thyroid nodule 12/10/2019     History reviewed. No pertinent surgical history.  Family History   Problem Relation Age of Onset   • Diabetes Biological Mother    • Heart attack Biological Mother    • Lung cancer Biological Father    • Colon cancer Biological Brother    • Diabetes Biological Brother    • Diabetes Maternal Grandmother      Current Outpatient Medications   Medication Sig Dispense Refill   • aspirin 81 mg enteric coated tablet Take 81 mg by mouth 2 times daily.     • BASAGLAR KWIKPEN U-100 INSULIN 100 unit/mL (3 mL) pen Inject 18 units under the skin daily (Patient taking differently: 17 Units. Inject 17 units under the skin daily) 18 mL 3   • blood-glucose sensor (DEXCOM G7 SENSOR) device device To monitor sugars 9 each 3   • DEXCOM G7  misc  TO CHECK SUGARS 1 each 0   • FLUoxetine (PROzac) 40 mg capsule Take 40 mg by mouth daily.     • meloxicam (MOBIC) 15 mg tablet Take 15 mg by mouth daily.     • pen needle, diabetic (BD MILLIE 2ND GEN PEN NEEDLE) 32 gauge x 5/32\" needle To inject upto " "4 times a day 400 each 3   • traZODone 50 mg tablet Take 50 mg by mouth.     • cholecalciferol, vitamin D3, (cholecalciferol) 1,000 unit (25 mcg) tablet Take 1,000 Units by mouth daily.     • cyclobenzaprine (FLEXERIL) 10 mg tablet Take 10 mg by mouth.     • cyclobenzaprine (FLEXERIL) 10 mg tablet TAKE 1 TABLET BY MOUTH 2 TIMES A DAY AS NEEDED FOR MUSCLE SPASMS FOR UP TO 10 DAYS.     • docusate sodium (COLACE) 100 mg capsule Take 100 mg by mouth.     • FLUoxetine (PROzac) 20 mg capsule Take 40 mg by mouth daily.     • insulin aspart U-100 (NovoLOG FlexPen U-100 Insulin) 100 unit/mL (3 mL) pen To take NovoLog with each meal as instructed including correction, maximum daily total 60 units 15 mL 3   • metaxalone (SKELAXIN) 800 mg tablet Take 800 mg by mouth 3 times daily.     • methylPREDNISolone (MEDROL DOSEPACK) 4 mg tablet See admin instr.     • methylPREDNISolone (MEDROL DOSEPACK) 4 mg tablet TAKE 6 TABLETS ON DAY 1 AS DIRECTED ON PACKAGE AND DECREASE BY 1 TAB EACH DAY FOR A TOTAL OF 6 DAYS     • mupirocin (BACTROBAN) 2 % ointment See admin instr.     • oxyCODONE (ROXICODONE) 5 mg immediate release tablet Take 5 mg by mouth.     • oxyCODONE (ROXICODONE) 5 mg immediate release tablet      • polyethylene glycol (MIRALAX) 17 gram packet Take 17 g by mouth daily.     • senna (SENOKOT) 8.6 mg tablet Take 8.6 mg by mouth.       No current facility-administered medications for this visit.     Allergies   Allergen Reactions   • Poison Ivy Extract Rash   • Hay Fever And Allergy Relief Other (see comments)   • Statins-Hmg-Coa Reductase Inhibitors      Other reaction(s): leg pain       ROS:  The complete review of systems is otherwise negative except as noted in HPI.    PHYSICAL EXAM:  Vitals:    02/20/24 0944   BP: 120/80   BP Location: Right upper arm   Patient Position: Sitting   Pulse: 78   Resp: 18   Temp: 36.7 °C (98 °F)   TempSrc: Temporal   SpO2: 99%   Weight: 96.5 kg (212 lb 12.8 oz)   Height: 1.88 m (6' 2\") "       Body mass index is 27.32 kg/m².    Wt Readings from Last 3 Encounters:   02/20/24 96.5 kg (212 lb 12.8 oz)   12/06/23 96.6 kg (213 lb)   08/09/23 96.2 kg (212 lb)        GENERAL: Well developed, well nourished, in no acute distress  EYES: conjunctiva pink and moist, no proptosis  NECK/LYMPHATIC: Supple, nl cervical lymphadenopathy, acanthosis no   THYROID: thyroid palpable, no distinct nodules palpated, non tender on my exam  CARDIOVASCULAR: Regular rate and Regular rhythm  RESPIRATORY: Symmetrical chest expansion, normal respiratory effort, clear to auscultation bilaterally  GASTROINTESTINAL: Soft, non tender, inj site lipodystrophy  MUSCULOSKELETAL: no cyanosis, normal muscle mass, no edema in lower extremities  SKIN: Warm, dry, no lesions   NEUROLOGIC: Awake, alert, and communicating appropriately       OUTSIDE RECORDS: reviewed. Pertinent positives summarized in HPI    LABS:   12/22 A1c 8.4, free T4 0.9, TSH 1.48, creatinine urine random 155  9/22 -glucose 135, C-peptide 0.48, A1c 8.1    6/22 -A1c 8.4,   3/22 -A1c 8.5, MANSOOR <5, IA-2 and Zn8 neg    12/21 -calcium 9.2, creatinine 1.16, LFTs normal, GFR over 60, urine microalbumin 10, , HDL 51, triglycerides 124, A1c 8.2    Hemoglobin A1C   Date Value Ref Range Status   02/19/2024 7.7 (H) <5.7 % Final     Comment:     In the absence of an established diagnosis of Diabetes Mellitus, HbA1c levels between 5.7% and 6.4% are indicative of increased risk for developing Diabetes(Pre-Diabetes). Levels of 6.5% or greater are diagnostic for Diabetes Mellitus.   12/04/2023 8.0 (H) 4.0 - 5.6 % Final   12/04/2023 7.6 (H) <5.7 % Final     Comment:     In the absence of an established diagnosis of Diabetes Mellitus, HbA1c levels between 5.7% and 6.4% are indicative of increased risk for developing Diabetes(Pre-Diabetes). Levels of 6.5% or greater are diagnostic for Diabetes Mellitus.     Glucose   Date Value Ref Range Status   02/19/2024 106 (H) 70 - 99 mg/dL Final      Sodium   Date Value Ref Range Status   02/19/2024 139 136 - 145 mEQ/L Final     Potassium   Date Value Ref Range Status   02/19/2024 4.4 3.5 - 5.1 mEQ/L Final     CO2   Date Value Ref Range Status   02/19/2024 29 21 - 31 mEQ/L Final     Chloride   Date Value Ref Range Status   02/19/2024 104 98 - 107 mEQ/L Final     BUN   Date Value Ref Range Status   02/19/2024 18 7 - 25 mg/dL Final     eGFR   Date Value Ref Range Status   02/19/2024 >60.0 >=60.0 mL/min/1.73m*2 Final     Comment:     Calculation based on the Chronic Kidney Disease Epidemiology Collaboration (CKD-EPI) equation refit without adjustment for race.     Creatinine   Date Value Ref Range Status   02/19/2024 1.0 0.7 - 1.3 mg/dL Final     ALT (SGPT)   Date Value Ref Range Status   02/19/2024 23 7 - 52 IU/L Final     Cholesterol   Date Value Ref Range Status   02/19/2024 201 (H) <=200 mg/dL Final     HDL   Date Value Ref Range Status   02/19/2024 54 >=40 mg/dL Final     Comment:     2001 NCEP GUIDELINES  <40 mg/dL Low  >=60 mg/dL High     Triglycerides   Date Value Ref Range Status   02/19/2024 113 <150 mg/dL Final       Imaging:   Results for orders placed during the hospital encounter of 04/17/23    ULTRASOUND THYROID [[id number]]    Narrative  CLINICAL HISTORY:Type 2 diabetes mellitus with hyperglycemia. History of thyroid  nodules.    COMMENT:  Ultrasound examination of the thyroid gland is performed with grayscale and  color Doppler imaging.    COMPARISON: Prior images are currently unavailable for comparison.    RIGHT: The right thyroid lobe measures 5.1 x 2.1 x 2.0 cm. The right thyroid  lobe is homogeneous in echotexture with no discrete nodules identified.    ISTHMUS: The isthmus measures 3.5 mm in maximal AP dimension.  Isthmic  parenchyma is homogeneous with no nodules identified.    LEFT:  The left thyroid lobe measures 4.4 x 2.2 x 2.1 cm. The left thyroid lobe  is heterogeneous in echotexture with underlying nodules identified.  Nodule #  1  Location: Left upper pole  Size: 0.8 x 0.7 x 0.8 cm  Composition: Mixed cystic and solid  (1 point)  Echogenicity: isoechoic (1 point)  Shape: Wider-than-tall (0 points)  Margin: Ill-defined (0 points)  Echogenic foci: None or large comet-tail artifacts (0 points)  Halo: None  Vascularity: Present peripherally  Comparison: None available  TI-RADS category: TR3 (3 points): Mildly suspicious.    Nodule # 2  Location: Left midpole  Size: 0.7 x 0.3 x 0.1 cm  Composition: Mixed cystic and solid  (1 point)  Echogenicity: Solid and coarsely calcified  Shape: Wider-than-tall (0 points)  Margin: Ill-defined (0 points)  Echogenic foci: Macrocalcifications (1 point)  Halo: None  Vascularity: Present  Comparison: None available  TI-RADS category: TR2 (2 points): Not suspicious.    There is no evidence for hypervascularity of the gland. No cervical  lymphadenopathy.    --    Impression  Left thyroid nodules as described above. Follow-up should be based according to  TI RADS criteria.    ----------------------------------------------------------------------------  TI-RADS recommendations from ACR White paper 2017  Composition:  Cystic or almost completely cystic = 0 points  Spongiform = 0 points  Mixed cystic and solid = 1 point  Solid or almost completely solid = 2 points  -  Echogenicity:  Anechoic = 0 points  Hyperechoic or isoechoic = 1 point  Hypoechoic = 2 points  Very hypoechoic = 3 points  -  Shape:  Wider than tall = 0 point  Taller than wide = 3 points  -  Margin:  Smooth = 0 point  Ill-defined = 0 point  Lobulated/irregular = 2 points  Extrathyroidal extension = 3 points  -    ASSESSMENT and PLAN    Akin Daily is a 73 y.o. male with YANCI complicated by hyperlipidemia    1. Diabetes Mellitus: YANCI/long-term insulin use    Lab Results   Component Value Date    HGBA1C 7.7 (H) 02/19/2024   Low C-peptide. MANSOOR, IA2, ZnT8 antibodies were negative, continue insulin    Recommendations based on review of CGM  A1c improved to  normal... 7.7    Overnight significant drop in blood sugars   Lower Basaglar 16 units daily   Increase NovoLog for dinner by 1 units and continue rest of the regimen   Continue follow-up with Coty BULL  Continue monitoring blood sugars Premeal, bedtime  To have bedtime snack like popcorn, cheese stick  To bring the scale he is using to the visit, check A1c at next visit   Continue Dexcom G7  To maintain the log and bring to the visit to review  Hypoglycemia 15-15    Advised pt to contact the office for further medication adjustment if blood glucose out of target range for 3-5 days below 70 or above 250        2.  Hypertension:   Urine micro albumin test normal, check in a year 3/25    3.  Hyperlipidemia: LDL not at goal  Statin, Zetia could not tolerate muscle leg cramps  Advised to see cardiology, recommend cardiac screening    4. Thyroid nodules  Last ultrasound 2019 stable left thyroid gland nodule and colloidal cyst  2 subcentimeter thyroid nodules, based on TIRADS do not need follow-up  Repeat thyroid ultrasound if changes clinically    5.  Lipohypertrophy  Rotating sites, reviewed alternative injection and CGM sites        Several elements of diabetes self management were reviewed including, but not limited to the importance of blood sugar monitoring, symptoms and treatment of hypoglycemia and hyperglycemia/A1c goals, compliance with medications, diet, exercise, lifestyle issues, surveillance of eyes and feet and need for routine follow-up with ophthalmology and podiatry.    Pt is aware of alternatives of therapy, risks and benefits and accepts risk of default.      I have answered all of my patient's questions to the best of my ability. To call us with any changes      Orders Placed This Encounter   Procedures   • Hemoglobin A1c       Return to office in 3 months or earlier if issues arise     This patient has been dictated using speech recognition software. Inadvertent speech recognition errors should be disregarded.  Please do not hesitate to call the office for clarification.

## 2024-03-04 ENCOUNTER — TELEPHONE (OUTPATIENT)
Dept: ENDOCRINOLOGY | Facility: CLINIC | Age: 74
End: 2024-03-04
Payer: COMMERCIAL

## 2024-03-04 NOTE — TELEPHONE ENCOUNTER
Spoke to patient and wife regarding upcoming appointment and moving it to 9a on 3/6.  Family agreed.      Also reviewed a basal test and patient will try it on Tuesday night.    Coty Schafer RD, LDN, [facility]

## 2024-03-06 ENCOUNTER — OFFICE VISIT (OUTPATIENT)
Dept: ENDOCRINOLOGY | Facility: CLINIC | Age: 74
End: 2024-03-06
Payer: COMMERCIAL

## 2024-03-06 DIAGNOSIS — E13.9 LADA (LATENT AUTOIMMUNE DIABETES IN ADULTS), MANAGED AS TYPE 1 (CMS/HCC): ICD-10-CM

## 2024-03-06 PROCEDURE — 97803 MED NUTRITION INDIV SUBSEQ: CPT

## 2024-03-06 PROCEDURE — 200200 PR NO CHARGE

## 2024-03-06 NOTE — Clinical Note
"Doing well.  Advised to still take meal time insulin even if he is below 150.  Showed Akin how to enter insulin dose into \"events\" on dexcom reader.  He will try to do this with each meal.  Wife brought in their sliding scale:   4 units, 101-156 6 units, 157-250 7 units, 251-300 8 units, 301-400 9 units  Does not look like they follow completely,  Breakfast 7-10, Lunch 7-10 (omits if less than 150 or high and only eating a cracker), Dinner 7-10 units. "

## 2024-03-06 NOTE — PROGRESS NOTES
"DETAILS OF VISIT   Reason for referral: diabetes education/nutrition       VISIT DESCRIPTION         Akin Daily is a 73 y.o. male here for a follow up nutrition evaluation.    Current Wt:   Wt Readings from Last 1 Encounters:   02/20/24 96.5 kg (212 lb 12.8 oz)     Ht:   Ht Readings from Last 1 Encounters:   02/20/24 1.88 m (6' 2\")          BMI Readings from Last 1 Encounters:   02/20/24 27.32 kg/m²       BMI Classification: 25- 29.9 Overweight      Last A1c:  Lab Results   Component Value Date    HGBA1C 7.7 (H) 02/19/2024       I met with Akin Daily today for Nutrition Counseling [Z71.3] and dietary education related to the following:    E10.9 - Type 1 diabetes mellitus without complications      Social hx:    Occupation: retired    Shift: n/a    Lives with: wife    Diet hx:    Current diet: unrestricted    Diet Education hx: yes, has met with dietitian    Dining out: Weekly    Recall:  B- oatmeal, strawberries, yogurt (zero carb), large banana  L- just peanut butter crackers or a meal with a banana, not always dosing at this meal  D- potato, meat, veggies  S- peanut butter crackers  Beverages- water    Sugar Sweetened Beverages: denies    Eats a large banana multiple times a day and will dose more    Pt educated on:   Carb sources   and Carb counting        Medications:     Medication affordability: no issue    Insulin injection doses and timing:        Long: Basaglar  Still taking 17 units      Short: Novolog   Wife brought in their sliding scale:   4 units, 101-156 6 units, 157-250 7 units, 251-300 8 units, 301-400 9 units  Does not look like they follow completely,   Breakfast 7-10, Lunch 7-10 (omits if less than 150 or high and only eating a cracker), Dinner 7-10 units.    Priming Pens? yes    Insulin injection/ infusion set location(s) and rotation: yes    Technology:    Diabetes Technology used: Dexcom G7    Blood Sugar information:    Data Review:  TIR: 60%  Observations: doing much better, needs meal " time insulin even if in range or high and only eating small amounts    Other:  Physical Activity: active with workshop    Emotional Wellness: coping, feeling better post knee replacement    Handouts Provided: none    Pt Goals:  Get A1C to 7%        Topics Reviewed:   Nutrition    Carbohydrate counting basics    Label reading review    Using sliding scale/ Insulin to carb ratios    Hidden carbs    Free foods    Timing of carbs before glucose testing    Snacking/grazing    Portion size estimates   Monitoring    Basic record keeping for good problem solving/strategies     When to reach out for help    What is an HbA1c and what does it mean   Acute Complications   Hypoglycemia    What is a low blood sugar    What are the causes of low blood sugar    Expected frequency of lows    Warning signs    Treating lows:  Carbs, glucagon, glucose gel.  Rule of 15    Safety:  carrying carbs/medic alert   Hyperglycemia    What is a high blood sugar    What causes high blood sugars    Treating high blood sugar with insulin/exercise/fluids   Psychosocial issues    Managing anxiety, stress    Integrating diabetes into day to day life      Time Spent with Patient for face to face office visit: 45 minutes    MEDICATIONS AND ALLERGIES     Current Outpatient Medications on File Prior to Visit   Medication Sig Dispense Refill   • aspirin 81 mg enteric coated tablet Take 81 mg by mouth 2 times daily.     • BASAGLAR KWIKPEN U-100 INSULIN 100 unit/mL (3 mL) pen Inject 16 Units under the skin nightly. Inject 17 units under the skin daily 30 mL 3   • blood-glucose sensor (DEXCOM G7 SENSOR) device device To monitor sugars 9 each 3   • cholecalciferol, vitamin D3, (cholecalciferol) 1,000 unit (25 mcg) tablet Take 1,000 Units by mouth daily.     • cyclobenzaprine (FLEXERIL) 10 mg tablet Take 10 mg by mouth.     • cyclobenzaprine (FLEXERIL) 10 mg tablet TAKE 1 TABLET BY MOUTH 2 TIMES A DAY AS NEEDED FOR MUSCLE SPASMS FOR UP TO 10 DAYS.     • DEXCOM  "G7  misc  TO CHECK SUGARS 1 each 0   • docusate sodium (COLACE) 100 mg capsule Take 100 mg by mouth.     • FLUoxetine (PROzac) 20 mg capsule Take 40 mg by mouth daily.     • FLUoxetine (PROzac) 40 mg capsule Take 40 mg by mouth daily.     • insulin aspart U-100 (NovoLOG Flexpen U-100 Insulin) 100 unit/mL (3 mL) pen To take NovoLog with each meal as instructed including correction, maximum daily total 60 units 30 mL 3   • meloxicam (MOBIC) 15 mg tablet Take 15 mg by mouth daily.     • metaxalone (SKELAXIN) 800 mg tablet Take 800 mg by mouth 3 times daily.     • methylPREDNISolone (MEDROL DOSEPACK) 4 mg tablet See admin instr.     • methylPREDNISolone (MEDROL DOSEPACK) 4 mg tablet TAKE 6 TABLETS ON DAY 1 AS DIRECTED ON PACKAGE AND DECREASE BY 1 TAB EACH DAY FOR A TOTAL OF 6 DAYS     • mupirocin (BACTROBAN) 2 % ointment See admin instr.     • oxyCODONE (ROXICODONE) 5 mg immediate release tablet Take 5 mg by mouth.     • oxyCODONE (ROXICODONE) 5 mg immediate release tablet      • pen needle, diabetic (BD MILLIE 2ND GEN PEN NEEDLE) 32 gauge x 5/32\" needle To inject upto 4 times a day 400 each 3   • polyethylene glycol (MIRALAX) 17 gram packet Take 17 g by mouth daily.     • senna (SENOKOT) 8.6 mg tablet Take 8.6 mg by mouth.     • traZODone 50 mg tablet Take 50 mg by mouth.       No current facility-administered medications on file prior to visit.         Poison ivy extract, Hay fever and allergy relief, and Statins-hmg-coa reductase inhibitors          RECOMMENDATIONS   - Take meal time insulin even if 150 or less  - try Basal test again, give meal time insulin at dinner, no more fast acting insulin after dinner, no more food after dinner.  If BG , continue test, if below 70, stop and treat, if greater than 300 give correction.    Follow up: 6 months    Coty Schafer RD, LDN, [facility]      3/6/2024                "

## 2024-05-20 ENCOUNTER — APPOINTMENT (OUTPATIENT)
Dept: LAB | Facility: CLINIC | Age: 74
End: 2024-05-20
Attending: INTERNAL MEDICINE
Payer: COMMERCIAL

## 2024-05-20 DIAGNOSIS — E13.9 LADA (LATENT AUTOIMMUNE DIABETES IN ADULTS), MANAGED AS TYPE 1 (CMS/HCC): ICD-10-CM

## 2024-05-20 LAB
EST. AVERAGE GLUCOSE BLD GHB EST-MCNC: 171 MG/DL
HBA1C MFR BLD: 7.6 %

## 2024-05-20 PROCEDURE — 36415 COLL VENOUS BLD VENIPUNCTURE: CPT

## 2024-05-20 PROCEDURE — 83036 HEMOGLOBIN GLYCOSYLATED A1C: CPT

## 2024-05-22 PROBLEM — G47.33 OBSTRUCTIVE SLEEP APNEA (ADULT) (PEDIATRIC): Status: ACTIVE | Noted: 2021-09-10

## 2024-05-22 PROBLEM — E11.9 DIABETES MELLITUS WITHOUT COMPLICATION (CMS/HCC): Status: ACTIVE | Noted: 2017-01-19

## 2024-05-22 PROBLEM — E13.9 OTHER SPECIFIED DIABETES MELLITUS WITHOUT COMPLICATIONS: Status: ACTIVE | Noted: 2018-07-25

## 2024-05-22 PROBLEM — M25.511 PAIN IN RIGHT SHOULDER: Status: ACTIVE | Noted: 2024-05-22

## 2024-05-22 PROBLEM — E13.9: Status: ACTIVE | Noted: 2024-05-22

## 2024-05-22 PROBLEM — E11.29 TYPE 2 DIABETES MELLITUS WITH OTHER DIABETIC KIDNEY COMPLICATION: Status: ACTIVE | Noted: 2024-05-22

## 2024-05-22 PROBLEM — E78.00 PURE HYPERCHOLESTEROLEMIA, UNSPECIFIED: Status: ACTIVE | Noted: 2021-09-10

## 2024-05-22 PROBLEM — M25.562 PAIN IN LEFT KNEE: Status: ACTIVE | Noted: 2023-07-14

## 2024-05-22 PROBLEM — M17.12 PRIMARY OSTEOARTHRITIS OF LEFT KNEE: Status: ACTIVE | Noted: 2024-01-25

## 2024-05-22 PROBLEM — H52.00 HYPEROPIA: Status: ACTIVE | Noted: 2018-04-09

## 2024-05-22 PROBLEM — J30.2 SEASONAL ALLERGIES: Status: ACTIVE | Noted: 2022-09-06

## 2024-05-22 PROBLEM — G03.9 MENINGITIS: Status: ACTIVE | Noted: 2022-09-06

## 2024-05-22 PROBLEM — E78.5 HYPERLIPIDEMIA, UNSPECIFIED: Status: ACTIVE | Noted: 2021-09-10

## 2024-05-22 PROBLEM — M54.41 LUMBAGO WITH SCIATICA, RIGHT SIDE: Status: ACTIVE | Noted: 2023-01-17

## 2024-05-22 PROBLEM — G47.00 INSOMNIA, UNSPECIFIED: Status: ACTIVE | Noted: 2021-01-27

## 2024-06-24 DIAGNOSIS — E13.9 LADA (LATENT AUTOIMMUNE DIABETES IN ADULTS), MANAGED AS TYPE 1 (CMS/HCC): ICD-10-CM

## 2024-06-24 RX ORDER — INSULIN GLARGINE 100 [IU]/ML
16 INJECTION, SOLUTION SUBCUTANEOUS NIGHTLY
Qty: 30 ML | Refills: 3 | OUTPATIENT
Start: 2024-06-24

## 2024-07-31 ENCOUNTER — CLINICAL SUPPORT (OUTPATIENT)
Dept: ENDOCRINOLOGY | Facility: CLINIC | Age: 74
End: 2024-07-31
Payer: COMMERCIAL

## 2024-07-31 DIAGNOSIS — Z79.4 TYPE 2 DIABETES MELLITUS WITH HYPERGLYCEMIA, WITH LONG-TERM CURRENT USE OF INSULIN (CMS/HCC): Primary | ICD-10-CM

## 2024-07-31 DIAGNOSIS — E11.65 TYPE 2 DIABETES MELLITUS WITH HYPERGLYCEMIA, WITH LONG-TERM CURRENT USE OF INSULIN (CMS/HCC): Primary | ICD-10-CM

## 2024-07-31 PROCEDURE — 97803 MED NUTRITION INDIV SUBSEQ: CPT

## 2024-07-31 PROCEDURE — 200200 PR NO CHARGE

## 2024-07-31 NOTE — PROGRESS NOTES
"DETAILS OF VISIT   Reason for referral: diabetes education/nutrition       VISIT DESCRIPTION         Akin Daily is a 74 y.o. male here for a follow up nutrition evaluation.    Current Wt:   Wt Readings from Last 1 Encounters:   02/20/24 96.5 kg (212 lb 12.8 oz)     Ht:   Ht Readings from Last 1 Encounters:   02/20/24 1.88 m (6' 2\")          BMI Readings from Last 1 Encounters:   02/20/24 27.32 kg/m²       BMI Classification: 25- 29.9 Overweight      Last A1c:  Lab Results   Component Value Date    HGBA1C 8.2 (H) 07/02/2024       I met with Akin Daily today for Nutrition Counseling [Z71.3] and dietary education related to the following:    E11.9 - Type 2 diabetes mellitus without complications     Diet hx:    Recall:  B- cheerios/cereal and milk and yogurt  L- sandwich, maybe ice cream  D- chicken, 2 ears corn, green beans, maybe ice cream  S- peanut butter and crackers, ice cream  Beverages- water, coffee    Sugar Sweetened Beverages: denies    Pt educated on:   Carb sources  , Carb estimation  , and Carb counting    Taking inconsistent amount of insulin with meals, took 10 units with meal last night, did not eat usual ice cream then went for walk and had low.  Then over treated and had rebound high.  Reviewed sliding scale, when to decrease meal time insulin, reviewed rule of 15 for lows.  Is eating snacks before bed if less than 150 and drifting up overnight.  Reviewed that meal time insulin is done working by the time he is going to bed and chances of him continuing to drop overnight and that he likely does not need snack.  Will try 1-2 nights without snack to see how his BG is.  If his numbers are less than 100 going to bed he will use rule of 15 and do finger stick to confirm he is back in range.      He is interested in GLP1 therapy.      Medications:   Medications for Diabetes: basal bolus    Medication affordability: no issues offered    Insulin injection doses and timing:        Long: Lantus  17 units      " Short: Novolog Sliding scale: Breakfast 8 units, Lunch 8 units, Dinner 8 units  Blood sugar:  151-200 take 1 unit                        201-250 take 2 units                        251-300 take 3 units                        301-350 take 4 units    Technology:    Diabetes Technology used: Dexcom G7          Blood Sugar information:    Data Review:  TIR: 50%  Observations: over treating lows, giving too much insulin at times for meals even if eating less    Other:  Physical Activity: walks    Emotional Wellness: coping    Handouts Provided: AVS    Pt Goals:  Understand blood sugars      Topics Reviewed:   Nutrition    Carbohydrate counting basics    Label reading review    Using sliding scale    Hidden carbs    Free foods    Timing of carbs before glucose testing    Snacking/grazing    Portion size estimates   Exercise    Benefits of exercise    Effects of exercise on blood glucose (hypoglycemia and hyperglycemia)   Acute Complications   Hypoglycemia    What is a low blood sugar    What are the causes of low blood sugar    Expected frequency of lows    Warning signs    Treating lows:  Carbs, glucagon, glucose gel.  Rule of 15    Safety:  carrying carbs/medic alert   Hyperglycemia    What is a high blood sugar            Time Spent with Patient for face to face office visit: 30 minutes    MEDICATIONS AND ALLERGIES     Current Outpatient Medications on File Prior to Visit   Medication Sig Dispense Refill    aspirin 81 mg enteric coated tablet Take 81 mg by mouth 2 times daily.      BASAGLAR KWIKPEN U-100 INSULIN 100 unit/mL (3 mL) pen Inject 16 Units under the skin nightly. Inject 17 units under the skin daily 30 mL 3    blood-glucose sensor (DEXCOM G7 SENSOR) device device To monitor sugars 9 each 3    cholecalciferol, vitamin D3, (cholecalciferol) 1,000 unit (25 mcg) tablet Take 1,000 Units by mouth daily.      citalopram (celeXA) 20 mg tablet Take 20 mg by mouth daily.      cyclobenzaprine (FLEXERIL) 10 mg tablet  "Take 10 mg by mouth.      cyclobenzaprine (FLEXERIL) 10 mg tablet TAKE 1 TABLET BY MOUTH 2 TIMES A DAY AS NEEDED FOR MUSCLE SPASMS FOR UP TO 10 DAYS.      DEXCOM G7  misc  TO CHECK SUGARS 1 each 0    docusate sodium (COLACE) 100 mg capsule Take 100 mg by mouth.      FLUoxetine (PROzac) 20 mg capsule Take 40 mg by mouth daily.      FLUoxetine (PROzac) 40 mg capsule Take 40 mg by mouth daily.      insulin aspart U-100 (NovoLOG Flexpen U-100 Insulin) 100 unit/mL (3 mL) pen To take NovoLog with each meal as instructed including correction, maximum daily total 60 units 30 mL 3    meloxicam (MOBIC) 15 mg tablet Take 15 mg by mouth daily.      metaxalone (SKELAXIN) 800 mg tablet Take 800 mg by mouth 3 times daily.      methylPREDNISolone (MEDROL DOSEPACK) 4 mg tablet See admin instr.      methylPREDNISolone (MEDROL DOSEPACK) 4 mg tablet TAKE 6 TABLETS ON DAY 1 AS DIRECTED ON PACKAGE AND DECREASE BY 1 TAB EACH DAY FOR A TOTAL OF 6 DAYS      mupirocin (BACTROBAN) 2 % ointment See admin instr.      oxyCODONE (ROXICODONE) 5 mg immediate release tablet Take 5 mg by mouth.      oxyCODONE (ROXICODONE) 5 mg immediate release tablet       pen needle, diabetic (BD MILLIE 2ND GEN PEN NEEDLE) 32 gauge x 5/32\" needle To inject upto 4 times a day 400 each 3    polyethylene glycol (MIRALAX) 17 gram packet Take 17 g by mouth daily.      senna (SENOKOT) 8.6 mg tablet Take 8.6 mg by mouth.      traZODone 50 mg tablet Take 50 mg by mouth.       No current facility-administered medications on file prior to visit.         Poison ivy extract, Hay fever and allergy relief, and Statins-hmg-coa reductase inhibitors          RECOMMENDATIONS   - take less base insulin at meal if eating less carbs, still using sliding scale for carbs  - if under 150, do not use sliding scale, but still take insulin for carbs    Follow up: 3 months    Coty Schafer RD,LDN,[facility]      7/31/2024                 "

## 2024-07-31 NOTE — Clinical Note
Taking inconsistent amount of insulin with meals, took 10 units with meal last night, did not eat usual ice cream then went for walk and had low.  Then over treated and had rebound high.  Reviewed sliding scale, when to decrease meal time insulin, reviewed rule of 15 for lows.  Is eating snacks before bed if less than 150 and drifting up overnight.  Reviewed that meal time insulin is done working by the time he is going to bed and chances of him continuing to drop overnight and that he likely does not need snack.  Will try 1-2 nights without snack to see how his BG is.  If his numbers are less than 100 going to bed he will use rule of 15 and do finger stick to confirm he is back in range.    He is interested in GLP1 therapy.

## 2024-07-31 NOTE — PATIENT INSTRUCTIONS
It was a pleasure seeing you today.      This is your new diabetes regimen    Sliding scale: Breakfast 8 units, Lunch 8 units, Dinner 8 units  Blood sugar:  151-200 take 1 unit                        201-250 take 2 units                        251-300 take 3 units                        301-350 take 4 units        When to call for emergencies:   Low blood sugars not responding to treatment      Low Blood sugars:  Anything under 70 mg is considered a low blood sugar.  Please treat with 15gm of fast acting carbohydrate (nothing with protein or fat), for example:  4 glucose tabs, 4 ounces of juice, 1 glucose gel.  Recheck blood sugar in 15 minutes.  If wearing a sensor, recheck in 15 minutes using a fingerstick.        Please reach out when you start noticing patterns of high or low blood sugars    Please rotate your insulin sites:        Remember basics of carb counting:  -Carbs=Carbohydrates  -Carbs breakdown to sugar in your body--Sugars are the building blocks of carbs.  Sugars fuel your body just like gas in a car, but too much can make your blood sugar high.  One of the best ways to manage your blood sugar is to eat smaller portions of carbs at each meal or if using insulin, give insulin with your meals/snacks.      REMEMBER!!  A product can claim 0 sugars, but still have carbohydrates.  Please check the labels for TOTAL CARBOHYDRATES                       Other resources  Additional information can be found in the American Diabetes Association.

## 2024-08-16 RX ORDER — BLOOD-GLUCOSE SENSOR
EACH MISCELLANEOUS
Qty: 9 EACH | Refills: 3 | Status: SHIPPED | OUTPATIENT
Start: 2024-08-16 | End: 2025-01-07 | Stop reason: SDUPTHER

## 2024-08-29 ENCOUNTER — OFFICE VISIT (OUTPATIENT)
Dept: ENDOCRINOLOGY | Facility: CLINIC | Age: 74
End: 2024-08-29
Payer: COMMERCIAL

## 2024-08-29 VITALS
DIASTOLIC BLOOD PRESSURE: 80 MMHG | WEIGHT: 217 LBS | HEART RATE: 68 BPM | SYSTOLIC BLOOD PRESSURE: 120 MMHG | TEMPERATURE: 97.9 F | BODY MASS INDEX: 27.85 KG/M2 | HEIGHT: 74 IN | OXYGEN SATURATION: 99 %

## 2024-08-29 DIAGNOSIS — E13.9 LADA (LATENT AUTOIMMUNE DIABETES IN ADULTS), MANAGED AS TYPE 1 (CMS/HCC): Primary | ICD-10-CM

## 2024-08-29 PROCEDURE — 3074F SYST BP LT 130 MM HG: CPT | Performed by: INTERNAL MEDICINE

## 2024-08-29 PROCEDURE — 95251 CONT GLUC MNTR ANALYSIS I&R: CPT | Performed by: INTERNAL MEDICINE

## 2024-08-29 PROCEDURE — 3008F BODY MASS INDEX DOCD: CPT | Performed by: INTERNAL MEDICINE

## 2024-08-29 PROCEDURE — 99214 OFFICE O/P EST MOD 30 MIN: CPT | Performed by: INTERNAL MEDICINE

## 2024-08-29 PROCEDURE — 3079F DIAST BP 80-89 MM HG: CPT | Performed by: INTERNAL MEDICINE

## 2024-08-29 RX ORDER — BUPROPION HYDROCHLORIDE 150 MG/1
150 TABLET ORAL DAILY
COMMUNITY
Start: 2024-07-02 | End: 2024-08-29

## 2024-08-29 NOTE — PROGRESS NOTES
REASON FOR VISIT:   Akin Daily is a 74 y.o. male who presents today for evaluation and management of his diabetes.   A consultation was requested by Ana Delgado MD.     Previous patient at [facility]. Interim followed Dr. Gregorio Coburn NP    08/29/24   Patient was accompanied by his wife who provided additional history.  No medical issues interim  Doing better with diabetes and taking insulin      Saw Coty twice and fins helpful  No issues interim  TKR no complications, torn meniscus and arthtisits  Using correction scale  Now feels better going to bedtime ~ 150  Hypoglycemia when n.p.o. for labs    Plan for TKR 1/6/24  Was told aortic murmer on exam  BF and lunch are closer within 2 hrs, low post lunch and overcorrects  Lunch variable peanut butter crackers/sandwich. Ice cream+, waffles for breakfast      BF better, blood glucoses better, needing less insulin  Breakfast added yogurt daily  Had 3 steroid inj, last was a month ago, High sugars lasting a week into 400's  Sun dinner - stromboli, chicken steak BG inc to >400,   He has large portion sizes, sweet tooth  He eats bedtime snack if sugars under 150, bedtime snacks  Cereal  Some of the hyperglycemia post dinner is due to not taking NovoLog Premeal if blood glucose under 100    DIABETES HISTORY:  Type of Diabetes: Initially type 2 in 2005 and as YANCI by Dr. Esquivel in 2014 (by blood test per wife). Since then on insulin  Prior DKA: no    Current diabetes treatment:   Basaglar 17 units PM  Novolog - BF ~ 8-9 units, lunch 8-10 units, dinner 8-10 units, 15 units for rice  Correction scale sugar >150 1 unit for every 50 mg     Past regimen:  Metformin, Jardiance  Stopped Metformin with belching    CGM downloaded and values reviewed: yes    Current regime: above    No. of days of tracings available 14  Average blood glucose 179  Stand dev 62  % above target 39  % at target 60  % below target 1    Continuous Glucose Monitoring (CGM) Interpretation  I reviewed  72 hours + of CGM data.   There is a trend for blood sugar blood sugars have improved, has some postprandial hyperglycemia, and post correction low, he has reverse hypoglycemia from excessive correction.    Recommendations: below          COMPLICATIONS OF DIABETES:  Eye disease in the past: no DR   Last eye exam: July 2024  Nephropathy:  no    Peripheral neuropathy: no   History of foot sores/infections/amputations: no   Podiatrist     Macrovascular disease: Hyperlipidemia, (stopped statin for muscle aches)      DIET/EXERCISE HISTORY:  Diet: 2-3 meals/day, lunch is very light peanut butter with cracker.  No protein for BF, BF is mainly carb cereal, rice inc BG to 300's  Snack: 2 small Ice cream bars daily, Peanut at night, or cracker if BS <180  Exercise: no      GLYCEMIC CONTROL:  Glucometer: One Touch  Frequency of glucose monitoring: Follows Dexcom G6  Review of home blood glucose readings:  Before breakfast:    After breakfast:   Before lunch:   After lunch:   Before dinner:   After dinner:   Bedtime:   2 am:   Hypoglycemic awareness: yes  Frequency of lows:     2 children physicians, foot and jamie specialist and dentist          Past Medical History:   Diagnosis Date    Benign prostatic hyperplasia 09/10/2021    Diabetes mellitus type I (CMS/HCC)     Gout, unspecified 09/10/2021    Heart murmur     Obstructive sleep apnea (adult) (pediatric) 09/10/2021    Other hyperlipidemia 02/11/2019    Thyroid nodule 12/10/2019     History reviewed. No pertinent surgical history.  Family History   Problem Relation Age of Onset    Diabetes Biological Mother     Heart attack Biological Mother     Lung cancer Biological Father     Colon cancer Biological Brother     Diabetes Biological Brother     Diabetes Maternal Grandmother      Current Outpatient Medications   Medication Sig Dispense Refill    BASAGLAR KWIKPEN U-100 INSULIN 100 unit/mL (3 mL) pen Inject 16 Units under the skin nightly. Inject 17 units under the skin daily  "30 mL 3    blood-glucose sensor (DEXCOM G7 SENSOR) device device TO MONITOR SUGARS 9 each 3    cholecalciferol, vitamin D3, (cholecalciferol) 1,000 unit (25 mcg) tablet Take 1,000 Units by mouth daily.      DEXCOM G7  misc  TO CHECK SUGARS 1 each 0    FLUoxetine (PROzac) 20 mg capsule Take 40 mg by mouth daily.      FLUoxetine (PROzac) 40 mg capsule Take 40 mg by mouth daily.      insulin aspart U-100 (NovoLOG Flexpen U-100 Insulin) 100 unit/mL (3 mL) pen To take NovoLog with each meal as instructed including correction, maximum daily total 60 units 30 mL 3    pen needle, diabetic (BD MILLIE 2ND GEN PEN NEEDLE) 32 gauge x 5/32\" needle To inject upto 4 times a day 400 each 3    traZODone 50 mg tablet Take 50 mg by mouth.       No current facility-administered medications for this visit.     Allergies   Allergen Reactions    Poison Sandra Extract Rash    Hay Fever And Allergy Relief Other (see comments)    Statins-Hmg-Coa Reductase Inhibitors      Other reaction(s): leg pain       ROS:  The complete review of systems is otherwise negative except as noted in HPI.    PHYSICAL EXAM:  Vitals:    08/29/24 1229   BP: 120/80   BP Location: Right upper arm   Patient Position: Sitting   Pulse: 68   Temp: 36.6 °C (97.9 °F)   SpO2: 99%   Weight: 98.4 kg (217 lb)   Height: 1.88 m (6' 2\")       Body mass index is 27.86 kg/m².    Wt Readings from Last 3 Encounters:   08/29/24 98.4 kg (217 lb)   02/20/24 96.5 kg (212 lb 12.8 oz)   12/06/23 96.6 kg (213 lb)        GENERAL: Well developed, well nourished, in no acute distress  EYES: conjunctiva pink and moist, no proptosis  NECK/LYMPHATIC: Supple, nl cervical lymphadenopathy, acanthosis no   THYROID: thyroid palpable, no distinct nodules palpated, non tender on my exam  CARDIOVASCULAR: Regular rate and Regular rhythm  RESPIRATORY: Symmetrical chest expansion, normal respiratory effort, clear to auscultation bilaterally  GASTROINTESTINAL: Soft, non tender, inj site " lipodystrophy  MUSCULOSKELETAL: no cyanosis, normal muscle mass, no edema in lower extremities  SKIN: Warm, dry, no lesions   NEUROLOGIC: Awake, alert, and communicating appropriately       OUTSIDE RECORDS: reviewed. Pertinent positives summarized in HPI    LABS:   7/24 -B12 443, creatinine 1.02, GFR, , triglycerides 131, TSH 1.18, free T4 1.09  12/22 A1c 8.4, free T4 0.9, TSH 1.48, creatinine urine random 155  9/22 -glucose 135, C-peptide 0.48, A1c 8.1    6/22 -A1c 8.4,   3/22 -A1c 8.5, MANSOOR <5, IA-2 and Zn8 neg    12/21 -calcium 9.2, creatinine 1.16, LFTs normal, GFR over 60, urine microalbumin 10, , HDL 51, triglycerides 124, A1c 8.2    Hemoglobin A1C   Date Value Ref Range Status   07/02/2024 8.2 (H) 4.8 - 5.6 % Final     Comment:              Prediabetes: 5.7 - 6.4           Diabetes: >6.4           Glycemic control for adults with diabetes: <7.0   05/20/2024 7.6 (H) <5.7 % Final     Comment:     In the absence of an established diagnosis of Diabetes Mellitus, HbA1c levels between 5.7% and 6.4% are indicative of increased risk for developing Diabetes(Pre-Diabetes). Levels of 6.5% or greater are diagnostic for Diabetes Mellitus.   02/19/2024 7.7 (H) <5.7 % Final     Comment:     In the absence of an established diagnosis of Diabetes Mellitus, HbA1c levels between 5.7% and 6.4% are indicative of increased risk for developing Diabetes(Pre-Diabetes). Levels of 6.5% or greater are diagnostic for Diabetes Mellitus.     Glucose   Date Value Ref Range Status   02/19/2024 106 (H) 70 - 99 mg/dL Final     Sodium   Date Value Ref Range Status   02/19/2024 139 136 - 145 mEQ/L Final     Potassium   Date Value Ref Range Status   02/19/2024 4.4 3.5 - 5.1 mEQ/L Final     CO2   Date Value Ref Range Status   02/19/2024 29 21 - 31 mEQ/L Final     Chloride   Date Value Ref Range Status   02/19/2024 104 98 - 107 mEQ/L Final     BUN   Date Value Ref Range Status   02/19/2024 18 7 - 25 mg/dL Final     eGFR   Date Value  Ref Range Status   02/19/2024 >60.0 >=60.0 mL/min/1.73m*2 Final     Comment:     Calculation based on the Chronic Kidney Disease Epidemiology Collaboration (CKD-EPI) equation refit without adjustment for race.     Creatinine   Date Value Ref Range Status   02/19/2024 1.0 0.7 - 1.3 mg/dL Final     ALT (SGPT)   Date Value Ref Range Status   02/19/2024 23 7 - 52 IU/L Final     Cholesterol   Date Value Ref Range Status   02/19/2024 201 (H) <=200 mg/dL Final     HDL   Date Value Ref Range Status   02/19/2024 54 >=40 mg/dL Final     Comment:     2001 NCEP GUIDELINES  <40 mg/dL Low  >=60 mg/dL High     Triglycerides   Date Value Ref Range Status   02/19/2024 113 <150 mg/dL Final       Imaging:   Results for orders placed during the hospital encounter of 04/17/23    ULTRASOUND THYROID [[id number]]    Narrative  CLINICAL HISTORY:Type 2 diabetes mellitus with hyperglycemia. History of thyroid  nodules.    COMMENT:  Ultrasound examination of the thyroid gland is performed with grayscale and  color Doppler imaging.    COMPARISON: Prior images are currently unavailable for comparison.    RIGHT: The right thyroid lobe measures 5.1 x 2.1 x 2.0 cm. The right thyroid  lobe is homogeneous in echotexture with no discrete nodules identified.    ISTHMUS: The isthmus measures 3.5 mm in maximal AP dimension.  Isthmic  parenchyma is homogeneous with no nodules identified.    LEFT:  The left thyroid lobe measures 4.4 x 2.2 x 2.1 cm. The left thyroid lobe  is heterogeneous in echotexture with underlying nodules identified.  Nodule # 1  Location: Left upper pole  Size: 0.8 x 0.7 x 0.8 cm  Composition: Mixed cystic and solid  (1 point)  Echogenicity: isoechoic (1 point)  Shape: Wider-than-tall (0 points)  Margin: Ill-defined (0 points)  Echogenic foci: None or large comet-tail artifacts (0 points)  Halo: None  Vascularity: Present peripherally  Comparison: None available  TI-RADS category: TR3 (3 points): Mildly suspicious.    Nodule #  2  Location: Left midpole  Size: 0.7 x 0.3 x 0.1 cm  Composition: Mixed cystic and solid  (1 point)  Echogenicity: Solid and coarsely calcified  Shape: Wider-than-tall (0 points)  Margin: Ill-defined (0 points)  Echogenic foci: Macrocalcifications (1 point)  Halo: None  Vascularity: Present  Comparison: None available  TI-RADS category: TR2 (2 points): Not suspicious.    There is no evidence for hypervascularity of the gland. No cervical  lymphadenopathy.    --    Impression  Left thyroid nodules as described above. Follow-up should be based according to  TI RADS criteria.    ----------------------------------------------------------------------------  TI-RADS recommendations from ACR White paper 2017  Composition:  Cystic or almost completely cystic = 0 points  Spongiform = 0 points  Mixed cystic and solid = 1 point  Solid or almost completely solid = 2 points  -  Echogenicity:  Anechoic = 0 points  Hyperechoic or isoechoic = 1 point  Hypoechoic = 2 points  Very hypoechoic = 3 points  -  Shape:  Wider than tall = 0 point  Taller than wide = 3 points  -  Margin:  Smooth = 0 point  Ill-defined = 0 point  Lobulated/irregular = 2 points  Extrathyroidal extension = 3 points  -    ASSESSMENT and PLAN    Akin Daily is a 74 y.o. male with YANCI complicated by hyperlipidemia    1. Diabetes Mellitus: YANCI/long-term insulin use    Lab Results   Component Value Date    HGBA1C 8.2 (H) 07/02/2024   Low C-peptide. MANSOOR, IA2, ZnT8 antibodies were negative, continue insulin    Recommendations based on review of CGM  A1c improved to 7. 6    Continue Basaglar 17 units daily   NovoLog 7 units for breakfast and 8 units for lunch and dinner  Advised to increase or decrease by 2 to 3 units based on thought content of the meal, reviewed with examples  follow-up with Coty CDE  Continue monitoring blood sugars Premeal, bedtime  Check labs prior to next visit  Continue Dexcom G7  Hypoglycemia 15-15, reviewed not to excessively correct for low  blood sugar    Advised to calibrate CGM with glucometer, when places new sensor on hyper or hypoglycemia      Advised pt to contact the office for further medication adjustment if blood glucose out of target range for 3-5 days below 70 or above 250        2.  Hypertension:   Urine micro albumin test normal, check in a year 3/25    3.  Hyperlipidemia: LDL not at goal  Statin, Zetia could not tolerate muscle leg cramps  Advised to see cardiology, recommend cardiac screening    4. Thyroid nodules  Last ultrasound 2019 stable left thyroid gland nodule and colloidal cyst  2 subcentimeter thyroid nodules, based on TIRADS do not need follow-up  Repeat thyroid ultrasound if changes clinically    5.  Lipohypertrophy  Rotating sites, reviewed alternative injection and CGM sites        Several elements of diabetes self management were reviewed including, but not limited to the importance of blood sugar monitoring, symptoms and treatment of hypoglycemia and hyperglycemia/A1c goals, compliance with medications, diet, exercise, lifestyle issues, surveillance of eyes and feet and need for routine follow-up with ophthalmology and podiatry.    Pt is aware of alternatives of therapy, risks and benefits and accepts risk of default.      I have answered all of my patient's questions to the best of my ability. To call us with any changes      Orders Placed This Encounter   Procedures    Hemoglobin A1c       Return to office in 3 months or earlier if issues arise     This patient has been dictated using speech recognition software. Inadvertent speech recognition errors should be disregarded. Please do not hesitate to call the office for clarification.

## 2024-08-29 NOTE — PATIENT INSTRUCTIONS
"Please continue to check your blood sugar 4 times a day-before meals and at bedtime.  Keep a log of your blood sugars.      Take your Basaglar 17 units at the same time every day.      Take your Novolog 7 units with breakfast, 8 units for  lunch, and dinner (meals).  T. Do not take if skips a meal.    PLUS    Take additional correction  novolog to lower high blood sugars as below.     If your sugar is 150-200 before a meal, take  1 unit    \"   201-250   \" 2 units   \"   251-300   \" 3 units                \"            301-350  \" 4 units     \"   351-400  \" 5 units       Hypoglycemia 15-15  THE 15:15 RULE    DURING THE DAY  If you get a reading below 70, eat/drink something that is 15 grams of carbohydrates (ex: half a cup of juice or regular soda, 4 glucose tablets, 3 small chewable candies, glass of milk), and recheck blood sugar reading in 15 minutes. Repeat if necessary to get blood sugar above 70.     AT BED TIME AND BEFORE DRIVING  CHECK YOU BLOOD GLUCOSE  If you get a reading below 100, eat/drink something that is 15 grams of carbohydrates (ex: half a cup of juice or regular soda, 4 glucose tablets, 3 small chewable candies, glass of milk), and recheck blood sugar reading in 15 minutes. Repeat if necessary to get blood sugar above 100      "

## 2024-08-30 ENCOUNTER — TELEPHONE (OUTPATIENT)
Dept: ENDOCRINOLOGY | Facility: CLINIC | Age: 74
End: 2024-08-30
Payer: COMMERCIAL

## 2024-08-30 NOTE — TELEPHONE ENCOUNTER
Patient called in because he is getting a colonoscopy Thursday Sept 5th at 2pm. He was told that he is not allowed to take any meds & only drink liquids before the procedure. Patient wanted Dr. Trinh advice & wanted to know if that is ok since he is diabetic. Pt would like a call back to further discuss.

## 2024-08-30 NOTE — TELEPHONE ENCOUNTER
Please provide patient colonoscopy instructions     Hold NovoLog when NPO  Take less Basaglar 12 units the night before colonoscopy when NPO  Sugar free clear diet   Resume regimen when start eating and tolerating diet    If you get a reading below 70, drink something that is 15 grams of carbohydrates (ex: half a cup of juice or regular soda, 4 glucose tablets, glass of milk), and recheck blood sugar reading in 15 minutes.  Repeat if necessary to get blood sugar above 70.     Please call us if anything changes.

## 2024-11-13 ENCOUNTER — CLINICAL SUPPORT (OUTPATIENT)
Dept: ENDOCRINOLOGY | Facility: CLINIC | Age: 74
End: 2024-11-13
Payer: COMMERCIAL

## 2024-11-13 DIAGNOSIS — E13.9: Primary | ICD-10-CM

## 2024-11-13 PROCEDURE — 200200 PR NO CHARGE

## 2024-11-13 PROCEDURE — 97803 MED NUTRITION INDIV SUBSEQ: CPT

## 2024-11-13 NOTE — PROGRESS NOTES
"DETAILS OF VISIT   Reason for referral: diabetes education/nutrition         VISIT DESCRIPTION         Akin Daily is a 74 y.o. male here for a follow up nutrition evaluation.    Current Wt:   Wt Readings from Last 1 Encounters:   08/29/24 98.4 kg (217 lb)     Ht:   Ht Readings from Last 1 Encounters:   08/29/24 1.88 m (6' 2\")          BMI Readings from Last 1 Encounters:   08/29/24 27.86 kg/m²       BMI Classification: 25- 29.9 Overweight      Last A1c:  Lab Results   Component Value Date    HGBA1C 8.2 (H) 07/02/2024       I met with Akin Daily today for Nutrition Counseling [Z71.3] and dietary education related to the following:    E13.9      Social hx:    Occupation: retired    Shift: n/a    Lives with: wife    Diet hx:    Current diet: unrestricted    Diet Education hx: has met with nutrition    Dining out: Weekly  Diet: 2-3 meals/day, lunch is very light peanut butter with cracker.  No protein for BF, BF is mainly carb cereal, rice inc BG to 300's  Snack: 2 small Ice cream bars daily, Peanut at night, or cracker if BS <180    Pt educated on:   Carb sources  , Nutritious vs non-nutritious carb sources  , Carb estimation  , and Carb counting    Akin remains at about 50% TIR.  If he is at 130 or less around 10p, he will eat crackers.  Discussed that his fast acting insulin from 5p is done working and there shouldn't be any reason why he would \"bottom out\" over night and need uncovered carbs.  We agreed on if BG id 110 or less, to eat 3-4 crackers at 10p but no more.  Also discussed that if he is eating a light dinner to dose 2 units less to avoid going low after dinner.  Also discussed that if he is going to be raking leaves after lunch or another strenuous activity after lunch, to dose less at lunch.  He understands and will do, versus over treating lows.  Will reach out when ready for follow up in 3-5 months    Medications:     Insulin injection doses and timing:        Long: Basaglar  17 units      Short: Novolog  " 7 units with B, 8 units with L and D (take less if active afterwards or eating lighter meal)      Technology:    Diabetes Technology used: Dexcom G7          Blood Sugar information:    Data Review:  TIR: 48%  Observations: eating foods to avoid lows even if not low      Pt Goals:  Get A1C in range        Topics Reviewed:   Nutrition    Carbohydrate counting basics    Label reading review    Using sliding scale/ Insulin to carb ratios    Hidden carbs    Free foods    Timing of carbs before glucose testing    Snacking/grazing    Portion size estimates   Exercise    Benefits of exercise    Effects of exercise on blood glucose (hypoglycemia and hyperglycemia)    Testing before during and after exercise    Treating lows before and during exercise   Monitoring    Target ranges    Entering data into CGM    When to reach out for help    What is an HbA1c and what does it mean   Acute Complications   Hypoglycemia    What is a low blood sugar    What are the causes of low blood sugar    Expected frequency of lows    Warning signs    Treating lows:  Carbs, glucagon, glucose gel.  Rule of 15    Safety:  carrying carbs/medic alert         Time Spent with Patient for face to face office visit: 30 minutes    MEDICATIONS AND ALLERGIES     Current Outpatient Medications on File Prior to Visit   Medication Sig Dispense Refill    BASAGLAR KWIKPEN U-100 INSULIN 100 unit/mL (3 mL) pen Inject 16 Units under the skin nightly. Inject 17 units under the skin daily 30 mL 3    blood-glucose sensor (DEXCOM G7 SENSOR) device device TO MONITOR SUGARS 9 each 3    cholecalciferol, vitamin D3, (cholecalciferol) 1,000 unit (25 mcg) tablet Take 1,000 Units by mouth daily.      DEXCOM G7  misc  TO CHECK SUGARS 1 each 0    FLUoxetine (PROzac) 20 mg capsule Take 40 mg by mouth daily.      FLUoxetine (PROzac) 40 mg capsule Take 40 mg by mouth daily.      insulin aspart U-100 (NovoLOG Flexpen U-100 Insulin) 100 unit/mL (3 mL) pen To take  "NovoLog with each meal as instructed including correction, maximum daily total 60 units 30 mL 3    pen needle, diabetic (BD MILLIE 2ND GEN PEN NEEDLE) 32 gauge x 5/32\" needle To inject upto 4 times a day 400 each 3    traZODone 50 mg tablet Take 50 mg by mouth.       No current facility-administered medications on file prior to visit.         Poison ivy extract, Hay fever and allergy relief, and Statins-hmg-coa reductase inhibitors          RECOMMENDATIONS   - don't eat crackers before bed unless  or less  - take less insulin at lunch or dinner if will be active after these meals or eating lighter    Follow up: 3-5 months  Coty Schafer RD, LDN,[facility]      11/13/2024                 "

## 2024-11-13 NOTE — Clinical Note
"Akin remains at about 50% TIR.  If he is at 130 or less around 10p, he will eat crackers.  Discussed that his fast acting insulin from 5p is done working and there shouldn't be any reason why he would \"bottom out\" over night and need uncovered carbs.  We agreed on if BG id 110 or less, to eat 3-4 crackers at 10p but no more.  Also discussed that if he is eating a light dinner to dose 2 units less to avoid going low after dinner.  Also discussed that if he is going to be raking leaves after lunch or another strenuous activity after lunch, to dose less at lunch.  He understands and will do, versus over treating lows.  Will reach out when ready for follow up in 3-5 months."

## 2025-01-07 ENCOUNTER — OFFICE VISIT (OUTPATIENT)
Dept: ENDOCRINOLOGY | Facility: CLINIC | Age: 75
End: 2025-01-07
Payer: COMMERCIAL

## 2025-01-07 VITALS
WEIGHT: 215 LBS | TEMPERATURE: 97.7 F | BODY MASS INDEX: 27.59 KG/M2 | OXYGEN SATURATION: 95 % | RESPIRATION RATE: 18 BRPM | HEART RATE: 66 BPM | DIASTOLIC BLOOD PRESSURE: 86 MMHG | SYSTOLIC BLOOD PRESSURE: 124 MMHG | HEIGHT: 74 IN

## 2025-01-07 DIAGNOSIS — E11.65 UNCONTROLLED TYPE 2 DIABETES MELLITUS WITH HYPERGLYCEMIA (CMS/HCC): ICD-10-CM

## 2025-01-07 DIAGNOSIS — Z79.4 LONG-TERM INSULIN USE (CMS/HCC): ICD-10-CM

## 2025-01-07 DIAGNOSIS — E13.9: Primary | ICD-10-CM

## 2025-01-07 DIAGNOSIS — E16.2 HYPOGLYCEMIA: ICD-10-CM

## 2025-01-07 DIAGNOSIS — E13.9 LADA (LATENT AUTOIMMUNE DIABETES IN ADULTS), MANAGED AS TYPE 1 (CMS/HCC): ICD-10-CM

## 2025-01-07 DIAGNOSIS — E78.5 HYPERLIPIDEMIA, UNSPECIFIED HYPERLIPIDEMIA TYPE: ICD-10-CM

## 2025-01-07 DIAGNOSIS — I10 HYPERTENSION, UNSPECIFIED TYPE: ICD-10-CM

## 2025-01-07 PROCEDURE — 3074F SYST BP LT 130 MM HG: CPT | Performed by: INTERNAL MEDICINE

## 2025-01-07 PROCEDURE — 3008F BODY MASS INDEX DOCD: CPT | Performed by: INTERNAL MEDICINE

## 2025-01-07 PROCEDURE — 3079F DIAST BP 80-89 MM HG: CPT | Performed by: INTERNAL MEDICINE

## 2025-01-07 PROCEDURE — 95251 CONT GLUC MNTR ANALYSIS I&R: CPT | Performed by: INTERNAL MEDICINE

## 2025-01-07 PROCEDURE — 99214 OFFICE O/P EST MOD 30 MIN: CPT | Performed by: INTERNAL MEDICINE

## 2025-01-07 RX ORDER — INSULIN ASPART 100 [IU]/ML
INJECTION, SOLUTION INTRAVENOUS; SUBCUTANEOUS
Qty: 30 ML | Refills: 3 | Status: SHIPPED | OUTPATIENT
Start: 2025-01-07 | End: 2025-01-07 | Stop reason: SDUPTHER

## 2025-01-07 RX ORDER — INSULIN GLARGINE 100 [IU]/ML
17 INJECTION, SOLUTION SUBCUTANEOUS NIGHTLY
Qty: 15 ML | Refills: 3 | Status: CANCELLED | OUTPATIENT
Start: 2025-01-07

## 2025-01-07 RX ORDER — MELATONIN 5 MG
10 CAPSULE ORAL NIGHTLY
COMMUNITY

## 2025-01-07 RX ORDER — INSULIN ASPART 100 [IU]/ML
INJECTION, SOLUTION INTRAVENOUS; SUBCUTANEOUS
Qty: 45 ML | Refills: 3 | Status: SHIPPED | OUTPATIENT
Start: 2025-01-07

## 2025-01-07 RX ORDER — INSULIN GLARGINE 100 [IU]/ML
17 INJECTION, SOLUTION SUBCUTANEOUS NIGHTLY
Qty: 30 ML | Refills: 3 | Status: SHIPPED | OUTPATIENT
Start: 2025-01-07 | End: 2025-03-07 | Stop reason: SDUPTHER

## 2025-01-07 RX ORDER — PEN NEEDLE, DIABETIC 30 GX3/16"
NEEDLE, DISPOSABLE MISCELLANEOUS
Qty: 400 EACH | Refills: 3 | Status: CANCELLED | OUTPATIENT
Start: 2025-01-07

## 2025-01-07 RX ORDER — PEN NEEDLE, DIABETIC 30 GX3/16"
NEEDLE, DISPOSABLE MISCELLANEOUS
Qty: 400 EACH | Refills: 3 | Status: SHIPPED | OUTPATIENT
Start: 2025-01-07

## 2025-01-07 RX ORDER — INSULIN ASPART 100 [IU]/ML
INJECTION, SOLUTION INTRAVENOUS; SUBCUTANEOUS
Qty: 30 ML | Refills: 3 | Status: CANCELLED | OUTPATIENT
Start: 2025-01-07

## 2025-01-07 RX ORDER — BLOOD-GLUCOSE SENSOR
EACH MISCELLANEOUS
Qty: 9 EACH | Refills: 3 | Status: SHIPPED | OUTPATIENT
Start: 2025-01-07

## 2025-01-07 NOTE — PROGRESS NOTES
"REASON FOR VISIT:   Akin Daily is a 74 y.o. male who presents today for evaluation and management of his diabetes.   A consultation was requested by Ana Delgado MD.     Previous patient at [facility]. Interim followed [facility], Dr. Perkins    01/07/25   Patient was accompanied by his wife who provided additional history.  No medical issues interim  Doing better with diabetes and taking insulin  Has ice cream almost daily mostly after  dinner, sometimes post lunch  Followed with Coty note below  Akin remains at about 50% TIR.  If he is at 130 or less around 10p, he will eat crackers.  Discussed that his fast acting insulin from 5p is done working and there shouldn't be any reason why he would \"bottom out\" over night and need uncovered carbs.  We agreed on if BG id 110 or less, to eat 3-4 crackers at 10p but no more.  Also discussed that if he is eating a light dinner to dose 2 units less to avoid going low after dinner.    Saw Coty twice and fins helpful  No issues interim  TKR no complications, torn meniscus and arthtisits  Using correction scale  Now feels better going to bedtime ~ 150  Hypoglycemia when n.p.o. for labs    Plan for TKR 1/6/24  Was told aortic murmer on exam  BF and lunch are closer within 2 hrs, low post lunch and overcorrects  Lunch variable peanut butter crackers/sandwich. Ice cream+, waffles for breakfast      BF better, blood glucoses better, needing less insulin  Breakfast added yogurt daily  Had 3 steroid inj, last was a month ago, High sugars lasting a week into 400's  Sun dinner - stromboli, chicken steak BG inc to >400,   He has large portion sizes, sweet tooth  He eats bedtime snack if sugars under 150, bedtime snacks  Cereal  Some of the hyperglycemia post dinner is due to not taking NovoLog Premeal if blood glucose under 100    DIABETES HISTORY:  Type of Diabetes: Initially type 2 in 2005 and as YANCI by Dr. Esquivel in 2014 (by blood test per wife). Since then on insulin  Prior DKA: " no    Current diabetes treatment:   Basaglar 17 units PM  Novolog - BF ~ 8 units, lunch 8 units, dinner 10-11 units, 15 units for rice  Correction scale sugar >150 1 unit for every 50 mg     Past regimen:  Metformin, Jardiance  Stopped Metformin with belching    CGM downloaded and values reviewed: yes    Current regime: above    No. of days of tracings available 14  Average blood glucose 177  Stand dev 61  % above target 40  % at target 60  % below target 1    Continuous Glucose Monitoring (CGM) Interpretation  I reviewed 72 hours + of CGM data.   There is a trend for blood sugar - blood sugars have improved, for majority has post breakfast correction low, random post lunch, postprandial high sometimes post lunch or post dinner, overnight blood sugars stable, no true hypoglycemia at night  Recommendations: below          COMPLICATIONS OF DIABETES:  Eye disease in the past: no DR   Last eye exam: July 2024  Nephropathy:  no    Peripheral neuropathy: no   History of foot sores/infections/amputations: no   Podiatrist     Macrovascular disease: Hyperlipidemia, (stopped statin for muscle aches)      DIET/EXERCISE HISTORY:  Diet: 2-3 meals/day, lunch is very light peanut butter with cracker.  No protein for BF, BF is mainly carb cereal, rice inc BG to 300's  Snack: 2 small Ice cream bars daily, Peanut at night, or cracker if BS <180  Exercise: no      GLYCEMIC CONTROL:  Glucometer: One Touch  Frequency of glucose monitoring: Follows Dexcom G6  Review of home blood glucose readings:  Before breakfast:    After breakfast:   Before lunch:   After lunch:   Before dinner:   After dinner:   Bedtime:   2 am:   Hypoglycemic awareness: yes  Frequency of lows:     2 children physicians, foot and jamie specialist and dentist          Past Medical History:   Diagnosis Date    Benign prostatic hyperplasia 09/10/2021    Diabetes mellitus type I (CMS/HCC)     Gout, unspecified 09/10/2021    Heart murmur     Obstructive sleep apnea  "(adult) (pediatric) 09/10/2021    Other hyperlipidemia 02/11/2019    Thyroid nodule 12/10/2019     No past surgical history on file.  Family History   Problem Relation Name Age of Onset    Diabetes Biological Mother      Heart attack Biological Mother      Lung cancer Biological Father      Colon cancer Biological Brother      Diabetes Biological Brother      Diabetes Maternal Grandmother       Current Outpatient Medications   Medication Sig Dispense Refill    BASAGLAR KWIKPEN U-100 INSULIN 100 unit/mL (3 mL) pen Inject 17 Units under the skin nightly. Inject 17 units under the skin daily 30 mL 3    blood-glucose sensor (DEXCOM G7 SENSOR) device device TO MONITOR SUGARS 9 each 3    cholecalciferol, vitamin D3, (cholecalciferol) 1,000 unit (25 mcg) tablet Take 1,000 Units by mouth daily.      DEXCOM G7  misc  TO CHECK SUGARS 1 each 0    FLUoxetine (PROzac) 40 mg capsule Take 40 mg by mouth daily. (Patient taking differently: Take 80 mg by mouth daily.)      insulin aspart U-100 (NovoLOG Flexpen U-100 Insulin) 100 unit/mL (3 mL) pen To take NovoLog with each meal as instructed including correction, maximum daily total 60 units 45 mL 3    melatonin 5 mg capsule Take 10 mg by mouth nightly.      pen needle, diabetic (BD MILLIE 2ND GEN PEN NEEDLE) 32 gauge x 5/32\" needle To inject upto 4 times a day 400 each 3    traZODone 50 mg tablet Take 50 mg by mouth. (Patient taking differently: Take 25 mg by mouth nightly.)       No current facility-administered medications for this visit.     Allergies   Allergen Reactions    Poison Ivy Extract Rash    Statins-Hmg-Coa Reductase Inhibitors Other (see comments)     Other reaction(s): leg pain       ROS:  The complete review of systems is otherwise negative except as noted in HPI.    PHYSICAL EXAM:  Vitals:    01/07/25 1400   BP: 124/86   BP Location: Right upper arm   Patient Position: Sitting   Pulse: 66   Resp: 18   Temp: 36.5 °C (97.7 °F)   TempSrc: Temporal " "  SpO2: 95%   Weight: 97.5 kg (215 lb)   Height: 1.88 m (6' 2\")       Body mass index is 27.6 kg/m².    Wt Readings from Last 3 Encounters:   01/07/25 97.5 kg (215 lb)   08/29/24 98.4 kg (217 lb)   02/20/24 96.5 kg (212 lb 12.8 oz)        GENERAL: Well developed, well nourished, in no acute distress  EYES: conjunctiva pink and moist, no proptosis  NECK/LYMPHATIC: Supple, nl cervical lymphadenopathy, acanthosis no   THYROID: thyroid palpable, no distinct nodules palpated, non tender on my exam  CARDIOVASCULAR: Regular rate and Regular rhythm  RESPIRATORY: Symmetrical chest expansion, normal respiratory effort, clear to auscultation bilaterally  GASTROINTESTINAL: Soft, non tender, inj site lipodystrophy  MUSCULOSKELETAL: no cyanosis, normal muscle mass, no edema in lower extremities  SKIN: Warm, dry, no lesions   NEUROLOGIC: Awake, alert, and communicating appropriately       OUTSIDE RECORDS: reviewed. Pertinent positives summarized in HPI    LABS:   7/24 -B12 443, creatinine 1.02, GFR, , triglycerides 131, TSH 1.18, free T4 1.09  12/22 A1c 8.4, free T4 0.9, TSH 1.48, creatinine urine random 155  9/22 -glucose 135, C-peptide 0.48, A1c 8.1    6/22 -A1c 8.4,   3/22 -A1c 8.5, MANSOOR <5, IA-2 and Zn8 neg    12/21 -calcium 9.2, creatinine 1.16, LFTs normal, GFR over 60, urine microalbumin 10, , HDL 51, triglycerides 124, A1c 8.2    Hemoglobin A1C   Date Value Ref Range Status   11/25/2024 8.6 (H) 4.8 - 5.6 % Final     Comment:              Prediabetes: 5.7 - 6.4           Diabetes: >6.4           Glycemic control for adults with diabetes: <7.0   07/02/2024 8.2 (H) 4.8 - 5.6 % Final     Comment:              Prediabetes: 5.7 - 6.4           Diabetes: >6.4           Glycemic control for adults with diabetes: <7.0   05/20/2024 7.6 (H) <5.7 % Final     Comment:     In the absence of an established diagnosis of Diabetes Mellitus, HbA1c levels between 5.7% and 6.4% are indicative of increased risk for developing " Diabetes(Pre-Diabetes). Levels of 6.5% or greater are diagnostic for Diabetes Mellitus.     Glucose   Date Value Ref Range Status   02/19/2024 106 (H) 70 - 99 mg/dL Final     Sodium   Date Value Ref Range Status   02/19/2024 139 136 - 145 mEQ/L Final     Potassium   Date Value Ref Range Status   02/19/2024 4.4 3.5 - 5.1 mEQ/L Final     CO2   Date Value Ref Range Status   02/19/2024 29 21 - 31 mEQ/L Final     Chloride   Date Value Ref Range Status   02/19/2024 104 98 - 107 mEQ/L Final     BUN   Date Value Ref Range Status   02/19/2024 18 7 - 25 mg/dL Final     eGFR   Date Value Ref Range Status   02/19/2024 >60.0 >=60.0 mL/min/1.73m*2 Final     Comment:     Calculation based on the Chronic Kidney Disease Epidemiology Collaboration (CKD-EPI) equation refit without adjustment for race.     Creatinine   Date Value Ref Range Status   02/19/2024 1.0 0.7 - 1.3 mg/dL Final     ALT (SGPT)   Date Value Ref Range Status   02/19/2024 23 7 - 52 IU/L Final     Cholesterol   Date Value Ref Range Status   02/19/2024 201 (H) <=200 mg/dL Final     HDL   Date Value Ref Range Status   02/19/2024 54 >=40 mg/dL Final     Comment:     2001 NCEP GUIDELINES  <40 mg/dL Low  >=60 mg/dL High     Triglycerides   Date Value Ref Range Status   02/19/2024 113 <150 mg/dL Final       Imaging:   Results for orders placed during the hospital encounter of 04/17/23    ULTRASOUND THYROID [[id number]]    Narrative  CLINICAL HISTORY:Type 2 diabetes mellitus with hyperglycemia. History of thyroid  nodules.    COMMENT:  Ultrasound examination of the thyroid gland is performed with grayscale and  color Doppler imaging.    COMPARISON: Prior images are currently unavailable for comparison.    RIGHT: The right thyroid lobe measures 5.1 x 2.1 x 2.0 cm. The right thyroid  lobe is homogeneous in echotexture with no discrete nodules identified.    ISTHMUS: The isthmus measures 3.5 mm in maximal AP dimension.  Isthmic  parenchyma is homogeneous with no nodules  identified.    LEFT:  The left thyroid lobe measures 4.4 x 2.2 x 2.1 cm. The left thyroid lobe  is heterogeneous in echotexture with underlying nodules identified.  Nodule # 1  Location: Left upper pole  Size: 0.8 x 0.7 x 0.8 cm  Composition: Mixed cystic and solid  (1 point)  Echogenicity: isoechoic (1 point)  Shape: Wider-than-tall (0 points)  Margin: Ill-defined (0 points)  Echogenic foci: None or large comet-tail artifacts (0 points)  Halo: None  Vascularity: Present peripherally  Comparison: None available  TI-RADS category: TR3 (3 points): Mildly suspicious.    Nodule # 2  Location: Left midpole  Size: 0.7 x 0.3 x 0.1 cm  Composition: Mixed cystic and solid  (1 point)  Echogenicity: Solid and coarsely calcified  Shape: Wider-than-tall (0 points)  Margin: Ill-defined (0 points)  Echogenic foci: Macrocalcifications (1 point)  Halo: None  Vascularity: Present  Comparison: None available  TI-RADS category: TR2 (2 points): Not suspicious.    There is no evidence for hypervascularity of the gland. No cervical  lymphadenopathy.    --    Impression  Left thyroid nodules as described above. Follow-up should be based according to  TI RADS criteria.    ----------------------------------------------------------------------------  TI-RADS recommendations from ACR White paper 2017  Composition:  Cystic or almost completely cystic = 0 points  Spongiform = 0 points  Mixed cystic and solid = 1 point  Solid or almost completely solid = 2 points  -  Echogenicity:  Anechoic = 0 points  Hyperechoic or isoechoic = 1 point  Hypoechoic = 2 points  Very hypoechoic = 3 points  -  Shape:  Wider than tall = 0 point  Taller than wide = 3 points  -  Margin:  Smooth = 0 point  Ill-defined = 0 point  Lobulated/irregular = 2 points  Extrathyroidal extension = 3 points  -    ASSESSMENT and PLAN    Akin Daily is a 74 y.o. male with YANCI complicated by hyperlipidemia    1. Diabetes Mellitus: YANCI/long-term insulin use    Lab Results   Component  Value Date    HGBA1C 8.6 (H) 11/25/2024   Low C-peptide. MANSOOR, IA2, ZnT8 antibodies were negative, continue insulin    Recommendations based on review of CGM  A1c improved to 7. 5, congratulated!    Continue Basaglar 17 units daily   Lower NovoLog 7 units for breakfast and 7 units for lunch days he does not have ice cream after lunch, 8 units if planning to have ice cream after lunch and continue dinner 10 to 11 units based on carb intake  Advised to increase or decrease by 2 to 3 units based on thought content of the meal,   follow-up with Coty CDE  Continue monitoring blood sugars Premeal, bedtime  Check labs prior to next visit  Continue Dexcom G7  Hypoglycemia 15-15, not to excessively correct for low blood sugar  Diet adherence discussed  Advised to calibrate CGM with glucometer, when places new sensor on hyper or hypoglycemia      Advised pt to contact the office for further medication adjustment if blood glucose out of target range for 3-5 days below 70 or above 250        2.  Hypertension: Blood pressure in range  Urine micro albumin test normal, check at next visit    3.  Hyperlipidemia: LDL not at goal  Statin, Zetia could not tolerate muscle leg cramps  Advised to see cardiology, recommend cardiac screening    4. Thyroid nodules  Last ultrasound 2019 stable left thyroid gland nodule and colloidal cyst  2 subcentimeter thyroid nodules, based on TIRADS do not need follow-up  Repeat thyroid ultrasound if changes clinically    5.  Lipohypertrophy  Rotating sites, rereviewed         Several elements of diabetes self management were reviewed including, but not limited to the importance of blood sugar monitoring, symptoms and treatment of hypoglycemia and hyperglycemia/A1c goals, compliance with medications, diet, exercise, lifestyle issues, surveillance of eyes and feet and need for routine follow-up with ophthalmology and podiatry.    Pt is aware of alternatives of therapy, risks and benefits and accepts risk  of default.      I have answered all of my patient's questions to the best of my ability. To call us with any changes      Orders Placed This Encounter   Procedures    Hemoglobin A1c    Microalbumin/Creatinine Ur Random       Return to office in 4 months or earlier if issues arise     This patient has been dictated using speech recognition software. Inadvertent speech recognition errors should be disregarded. Please do not hesitate to call the office for clarification.

## 2025-03-07 ENCOUNTER — TELEPHONE (OUTPATIENT)
Dept: ENDOCRINOLOGY | Facility: CLINIC | Age: 75
End: 2025-03-07
Payer: COMMERCIAL

## 2025-03-07 DIAGNOSIS — E13.9: ICD-10-CM

## 2025-03-07 RX ORDER — INSULIN GLARGINE 100 [IU]/ML
17 INJECTION, SOLUTION SUBCUTANEOUS NIGHTLY
Qty: 30 ML | Refills: 3 | Status: SHIPPED | OUTPATIENT
Start: 2025-03-07 | End: 2025-04-23 | Stop reason: SDUPTHER

## 2025-03-07 NOTE — TELEPHONE ENCOUNTER
Pt called in to request medication refill - sending message to endo clinical team to refill the medication

## 2025-04-23 DIAGNOSIS — E13.9: ICD-10-CM

## 2025-04-23 RX ORDER — INSULIN GLARGINE 100 [IU]/ML
17 INJECTION, SOLUTION SUBCUTANEOUS NIGHTLY
Qty: 30 ML | Refills: 3 | Status: SHIPPED | OUTPATIENT
Start: 2025-04-23

## 2025-05-02 ENCOUNTER — RESULTS FOLLOW-UP (OUTPATIENT)
Dept: ENDOCRINOLOGY | Facility: CLINIC | Age: 75
End: 2025-05-02

## 2025-05-02 LAB
ALBUMIN/CREAT UR: 8 MG/G CREAT (ref 0–29)
CREAT UR-MCNC: 255.8 MG/DL
HBA1C MFR BLD: 8.3 % (ref 4.8–5.6)
MICROALBUMIN UR-MCNC: 19.3 UG/ML

## 2025-05-14 ENCOUNTER — OFFICE VISIT (OUTPATIENT)
Dept: ENDOCRINOLOGY | Facility: CLINIC | Age: 75
End: 2025-05-14
Payer: COMMERCIAL

## 2025-05-14 VITALS
BODY MASS INDEX: 27.34 KG/M2 | TEMPERATURE: 97.3 F | SYSTOLIC BLOOD PRESSURE: 110 MMHG | WEIGHT: 213 LBS | HEIGHT: 74 IN | RESPIRATION RATE: 18 BRPM | DIASTOLIC BLOOD PRESSURE: 68 MMHG | HEART RATE: 71 BPM | OXYGEN SATURATION: 97 %

## 2025-05-14 DIAGNOSIS — E13.9: Primary | ICD-10-CM

## 2025-05-14 DIAGNOSIS — E65 LIPOHYPERTROPHY: ICD-10-CM

## 2025-05-14 DIAGNOSIS — E78.5 HYPERLIPIDEMIA, UNSPECIFIED HYPERLIPIDEMIA TYPE: ICD-10-CM

## 2025-05-14 DIAGNOSIS — Z79.4 LONG-TERM INSULIN USE (CMS/HCC): ICD-10-CM

## 2025-05-14 DIAGNOSIS — I10 HYPERTENSION, UNSPECIFIED TYPE: ICD-10-CM

## 2025-05-14 PROCEDURE — 3078F DIAST BP <80 MM HG: CPT | Performed by: INTERNAL MEDICINE

## 2025-05-14 PROCEDURE — 3008F BODY MASS INDEX DOCD: CPT | Performed by: INTERNAL MEDICINE

## 2025-05-14 PROCEDURE — 95251 CONT GLUC MNTR ANALYSIS I&R: CPT | Performed by: INTERNAL MEDICINE

## 2025-05-14 PROCEDURE — 99214 OFFICE O/P EST MOD 30 MIN: CPT | Performed by: INTERNAL MEDICINE

## 2025-05-14 PROCEDURE — 3074F SYST BP LT 130 MM HG: CPT | Performed by: INTERNAL MEDICINE

## 2025-05-14 RX ORDER — BUPROPION HYDROCHLORIDE 200 MG/1
200 TABLET, EXTENDED RELEASE ORAL DAILY
COMMUNITY
Start: 2025-04-29

## 2025-05-14 RX ORDER — PRAZOSIN HYDROCHLORIDE 1 MG/1
1 CAPSULE ORAL NIGHTLY
COMMUNITY
Start: 2025-02-25

## 2025-05-14 RX ORDER — ACETAMINOPHEN 325 MG/1
650 TABLET ORAL EVERY 6 HOURS PRN
COMMUNITY
Start: 2025-04-15

## 2025-05-16 ENCOUNTER — TELEPHONE (OUTPATIENT)
Dept: PRIMARY CARE | Facility: CLINIC | Age: 75
End: 2025-05-16
Payer: COMMERCIAL

## 2025-07-15 ENCOUNTER — CLINICAL SUPPORT (OUTPATIENT)
Dept: ENDOCRINOLOGY | Facility: CLINIC | Age: 75
End: 2025-07-15
Payer: COMMERCIAL

## 2025-07-15 DIAGNOSIS — E13.9 LADA (LATENT AUTOIMMUNE DIABETES IN ADULTS), MANAGED AS TYPE 1 (CMS/HCC): Primary | ICD-10-CM

## 2025-07-15 PROCEDURE — 200200 PR NO CHARGE

## 2025-07-15 PROCEDURE — 97803 MED NUTRITION INDIV SUBSEQ: CPT

## 2025-07-15 NOTE — PROGRESS NOTES
"DETAILS OF VISIT   Reason for referral: diabetes education/nutrition       VISIT DESCRIPTION         Akin Daily is a 75 y.o. male here for a follow up nutrition evaluation.    Current Wt:   Wt Readings from Last 1 Encounters:   05/14/25 96.6 kg (213 lb)     Ht:   Ht Readings from Last 1 Encounters:   05/14/25 1.88 m (6' 2\")          BMI Readings from Last 1 Encounters:   05/14/25 27.35 kg/m²       BMI Classification: 25- 29.9 Overweight      Last A1c:  Lab Results   Component Value Date    HGBA1C 8.3 (H) 05/01/2025       I met with Akin Daily today for Nutrition Counseling [Z71.3] and dietary education related to the following:    E10.9 - Type 1 diabetes mellitus without complications        Diet hx:    Current diet: unrestricted/carb aware    Diet Education hx: yes    Dining out: Monthly     Recall:  B- honey nut cheerios with milk (9-11 units)  L- peanut butter crackers (7 units)  D- mashed potatoes, green beans, meat or pasta (9-11 units)  S- doesn't snack or peanuts/peanut butter crackers  Beverages- water    Sugar Sweetened Beverages: denies    Pt educated on:   Carb estimation   and Carb counting         Akin and wife came to appointment, they are ready to try Omnipod.  Wife helps with carb counting.  Omnipod has a custom foods library that I can help build with his foods/carbs he is eating and he would just select the foods and enter.  Reviewed how the pump works with the dexcom and they are eager to get started.  He does not forget to bolus and would benefit from the adaptive basal.        Pt Goals:  Get on Insulin pump    Medications:     Insulin injection doses and timing:        Long: Basaglar  17 units      Short: NovologLower NovoLog 7 units for breakfast and 6 units for lunch (if has lunch )and continue dinner 10 to 11 units based on carb intake       Technology:    Diabetes Technology used: Dexcom G7            Blood Sugar information:    Data Review:  TIR: 29%  Observations: needs more insulin, would " benefit from AID    Topics Reviewed:   Nutrition    Carbohydrate counting basics    Label reading review    Hidden carbs    Free foods    Timing of carbs before glucose testing    Snacking/grazing    Portion size estimates                          Using sliding scale/ Insulin to carb ratios   Exercise    Benefits of exercise    Effects of exercise on blood glucose (hypoglycemia and hyperglycemia)    Testing before during and after exercise    Treating lows before and during exercise   Medications    Types of insulin                          Timing of doses    Site rotation/lipohypertrophy                          Review of other diabetes medicine and purpose   Monitoring    Target ranges    Entering data into CGM    When to reach out for help    What is an HbA1c and what does it mean   Acute Complications   Hypoglycemia    What is a low blood sugar    What are the causes of low blood sugar    Expected frequency of lows    Warning signs    Treating lows:  Carbs, glucagon, glucose gel.  Rule of 15    Safety:  carrying carbs/medic alert   Hyperglycemia    What is a high blood sugar    What causes high blood sugars    Treating high blood sugar with insulin/exercise/fluids   Ketones    How and when to check / tips for accuracy    Managing illness    Managing ketone basics/ when to call for help   Chronic Complications    Consequences of poor control    Strategies for preventing/minimizing risks   Psychosocial issues    Managing anxiety, stress    Integrating diabetes into day to day life      Time Spent with Patient for face to face office visit: 40 minutes    MEDICATIONS AND ALLERGIES     Medications Ordered Prior to Encounter[1]      Poison ivy extract and Statins-hmg-coa reductase inhibitors          RECOMMENDATIONS   - get started on pump with CD  -     Follow up: once pump     Coty Schafer RD,LDN, [facility]      7/15/2025                      [1]   Current Outpatient Medications on File Prior to Visit   Medication  "Sig Dispense Refill    acetaminophen (TYLENOL) 325 mg tablet Take 650 mg by mouth every 6 (six) hours as needed for mild pain. (Patient not taking: Reported on 5/14/2025)      BASAGLAR KWIKPEN U-100 INSULIN 100 unit/mL (3 mL) pen Inject 17 Units under the skin nightly. 30 mL 3    blood-glucose sensor (DEXCOM G7 SENSOR) device device Use to continuously monitor sugars 9 each 3    buPROPion SR (WELLBUTRIN SR) 200 mg 12 hr tablet Take 200 mg by mouth daily.      cholecalciferol, vitamin D3, (cholecalciferol) 1,000 unit (25 mcg) tablet Take 1,000 Units by mouth daily.      DEXCOM G7  misc  TO CHECK SUGARS 1 each 0    FLUoxetine (PROzac) 40 mg capsule Take 40 mg by mouth daily.      insulin aspart U-100 (NovoLOG Flexpen U-100 Insulin) 100 unit/mL (3 mL) pen To take NovoLog with each meal as instructed including correction, maximum daily total 60 units 45 mL 3    melatonin 5 mg capsule Take 10 mg by mouth nightly.      pen needle, diabetic (BD MILLIE 2ND GEN PEN NEEDLE) 32 gauge x 5/32\" needle To inject upto 4 times a day 400 each 3    prazosin (MINIPRESS) 1 mg capsule Take 1 mg by mouth nightly.      VITAMIN B COMPLEX ORAL Take 1 capsule by mouth daily.       No current facility-administered medications on file prior to visit.     "

## 2025-07-15 NOTE — Clinical Note
Akin and wife came to appointment, they are ready to try Omnipod.  Wife helps with carb counting.  Omnipod has a custom foods library that I can help build with his foods/carbs he is eating and he would just select the foods and enter.  Reviewed how the pump works with the dexcom and they are eager to get started.  He does not forget to bolus and would benefit from the adaptive basal.  He has lots of support from son/daughter and wife. Please send prescription for the Omnipod O5 Intro kit with the G6/G7, thanks!

## 2025-07-16 RX ORDER — INSULIN PMP CART,AUT,G6/7,CNTR
EACH SUBCUTANEOUS
Qty: 1 EACH | Refills: 0 | Status: SHIPPED | OUTPATIENT
Start: 2025-07-16

## 2025-07-16 RX ORDER — INSULIN PMP CART,AUT,G6/7,CNTR
EACH SUBCUTANEOUS
Qty: 30 EACH | Refills: 3 | Status: SHIPPED | OUTPATIENT
Start: 2025-07-16

## 2025-08-09 DIAGNOSIS — E11.65 UNCONTROLLED TYPE 2 DIABETES MELLITUS WITH HYPERGLYCEMIA (CMS/HCC): ICD-10-CM

## 2025-08-11 RX ORDER — BLOOD-GLUCOSE SENSOR
EACH MISCELLANEOUS
Qty: 9 EACH | Refills: 3 | Status: SHIPPED | OUTPATIENT
Start: 2025-08-11